# Patient Record
Sex: FEMALE | Race: WHITE | NOT HISPANIC OR LATINO | Employment: UNEMPLOYED | URBAN - METROPOLITAN AREA
[De-identification: names, ages, dates, MRNs, and addresses within clinical notes are randomized per-mention and may not be internally consistent; named-entity substitution may affect disease eponyms.]

---

## 2017-04-11 ENCOUNTER — HOSPITAL ENCOUNTER (EMERGENCY)
Facility: MEDICAL CENTER | Age: 37
End: 2017-04-12
Attending: EMERGENCY MEDICINE
Payer: MEDICAID

## 2017-04-11 DIAGNOSIS — R10.11 RUQ ABDOMINAL PAIN: ICD-10-CM

## 2017-04-11 DIAGNOSIS — R14.0 ABDOMINAL DISTENSION: Primary | ICD-10-CM

## 2017-04-11 DIAGNOSIS — Z87.19 HISTORY OF CIRRHOSIS: ICD-10-CM

## 2017-04-11 PROCEDURE — 99284 EMERGENCY DEPT VISIT MOD MDM: CPT

## 2017-04-11 ASSESSMENT — PAIN SCALES - GENERAL: PAINLEVEL_OUTOF10: 5

## 2017-04-11 ASSESSMENT — LIFESTYLE VARIABLES: DO YOU DRINK ALCOHOL: NO

## 2017-04-11 NOTE — ED AVS SNAPSHOT
Arvinas Access Code: [email or web address]  Expires: 5/12/2017  4:01 AM    Your email address is not on file at [employer]  Email Addresses are required for you to sign up for Arvinas, please contact [phone] to verify your personal information and to provide your email address prior to attempting to register for Arvinas.    Talia Salvatelli Moniz  [address] [place] [address]    Arvinas  A secure, online tool to manage your health information     [employer]s Arvinas® is a secure, online tool that connects you to your personalized health information from the privacy of your home -- day or night - making it very easy for you to manage your healthcare. Once the activation process is completed, you can even access your medical information using the Arvinas frank, which is available for free in the Apple Frank store or Google Play store.     To learn more about Arvinas, visit [email or web address]    There are two levels of access available (as shown below):   My Chart Features  [facility] Primary Care Doctor [facility]  Specialists [facility]  Urgent  Care Non-[facility] Primary Care Doctor   Email your healthcare team securely and privately 24/7 X X X    Manage appointments: schedule your next appointment; view details of past/upcoming appointments X      Request prescription refills. X      View recent personal medical records, including lab and immunizations X X X X   View health record, including health history, allergies, medications X X X X   Read reports about your outpatient visits, procedures, consult and ER notes X X X X   See your discharge summary, which is a recap of your hospital and/or ER visit that includes your diagnosis, lab results, and care plan X X  X     How to register for IKOR METERINGt:  Once your e-mail address has been verified, follow the following steps to sign up for IKOR METERINGt.     1. Go to  [email or web address]  2. Click on the Sign Up Now box, which takes you to the New Member Sign Up page. You  will need to provide the following information:  a. Enter your Your Survival Access Code exactly as it appears at the top of this page. (You will not need to use this code after you’ve completed the sign-up process. If you do not sign up before the expiration date, you must request a new code.)   b. Enter your date of birth.   c. Enter your home email address.   d. Click Submit, and follow the next screen’s instructions.  3. Create a Small Demonst ID. This will be your Your Survival login ID and cannot be changed, so think of one that is secure and easy to remember.  4. Create a Your Survival password. You can change your password at any time.  5. Enter your Password Reset Question and Answer. This can be used at a later time if you forget your password.   6. Enter your e-mail address. This allows you to receive e-mail notifications when new information is available in Your Survival.  7. Click Sign Up. You can now view your health information.    For assistance activating your Your Survival account, call [phone]

## 2017-04-11 NOTE — ED AVS SNAPSHOT
Home Care Instructions                                                                                                                Talia Salvatelli Moniz   MRN: [id number]    Department:  [facility], Emergency Dept   Date of Visit:  4/11/2017            [facility], Emergency Dept    [address] [facility] [address]    Phone:  [phone]      You were seen by     Hedy Saenz D.O.      Your Diagnosis Was     Abdominal distension     R14.0       Follow-up Information     1. Follow up with [facility] In 1 day.    Contact information    [address]  [facility]  [phone]          2. Follow up with [facility] [place] [facility] day.    Contact information    [address] [address] [phone]        3. Follow up with [facility] In 1 day.    Contact information    [address]  [facility] [address] [phone]          4. Follow up with [facility].    Contact information    [facility] [address] [phone]        Medication Information     Review all of your home medications and newly ordered medications with your primary doctor and/or pharmacist as soon as possible. Follow medication instructions as directed by your doctor and/or pharmacist.     Please keep your complete medication list with you and share with your physician. Update the information when medications are discontinued, doses are changed, or new medications (including over-the-counter products) are added; and carry medication information at all times in the event of emergency situations.               Medication List      ASK your doctor about these medications        Instructions    Morning Afternoon Evening Bedtime    lorazepam 0.5 MG Tabs   Commonly known as:  ATIVAN        Take 1 Tab by mouth every four hours as needed (vertigo).   Dose:  0.5 mg                                Procedures and tests performed during your visit     BETA-HCG QUALITATIVE URINE    CARDIAC MONITORING    CBC WITH DIFFERENTIAL    COMP METABOLIC PANEL    Cardiac Monitoring    ESTIMATED GFR    LIPASE    O2 Protocol    Pulse Ox    REFRACTOMETER SG    SALINE LOCK    URINALYSIS,CULTURE IF INDICATED    US-GALLBLADDER        Discharge Instructions       Return to the emergency department 24 hours for any persistent abdominal pain.  Return to the emergency department immediately for worsening abdominal pain, fever, vomiting, rectal bleeding or other new concerns.  Otherwise, follow-up with primary care 1-2 days for reevaluation, referral to GI, medication management and close blood pressure monitoring.    Clear liquid diet, advance to bland as tolerated.    Abdominal Pain, Adult  Many things can cause belly (abdominal) pain. Most times, the belly pain is not dangerous. Many cases of belly pain can be watched and treated at home.  HOME CARE   · Do not take medicines that help you go poop (laxatives) unless told to by your doctor.  · Only take medicine as told by your doctor.  · Eat or drink as told by your doctor. Your doctor will tell you if you should be on a special diet.  GET HELP IF:  · You do not know what is causing your belly pain.  · You have belly pain while you are sick to your stomach (nauseous) or have runny poop (diarrhea).  · You have pain while you pee or poop.  · Your belly pain wakes you up at night.  · You have belly pain that gets worse or better when you eat.  · You have belly pain that gets worse when you eat fatty foods.  · You have a fever.  GET HELP RIGHT AWAY IF:   · The pain does not go away within 2 hours.  · You keep throwing up (vomiting).  · The pain changes and is only in the right or left part of the belly.  · You have bloody or tarry looking poop.  MAKE SURE YOU:   · Understand these instructions.  · Will watch your condition.  · Will get help right away if you are not doing well or get worse.     This information is not intended to replace advice given to you by your  health care provider. Make sure you discuss any questions you have with your health care provider.     Document Released: 06/05/2009 Document Revised: 01/08/2016 Document Reviewed: 08/27/2014  Audioscribe Interactive Patient Education ©2016 [employer]  Cirrhosis  Cirrhosis is long-term (chronic) liver injury. The liver is your largest internal organ, and it performs many functions. The liver converts food into energy, removes toxic material from your blood, makes important proteins, and absorbs necessary vitamins from your diet.  If you have cirrhosis, it means many of your healthy liver cells have been replaced by scar tissue. This prevents blood from flowing through your liver, which makes it difficult for your liver to function. This scarring is not reversible, but treatment can prevent it from getting worse.   CAUSES   Hepatitis C and long-term alcohol abuse are the most common causes of cirrhosis. Other causes include:  · Nonalcoholic fatty liver disease.  · Hepatitis B infection.  · Autoimmune hepatitis.  · Diseases that cause blockage of ducts inside the liver.  · Inherited liver diseases.  · Reactions to certain long-term medicines.  · Parasitic infections.  · Long-term exposure to certain toxins.  RISK FACTORS  You may have a higher risk of cirrhosis if you:  · Have certain hepatitis viruses.  · Abuse alcohol, especially if you are female.  · Are overweight.  · Share needles.  · Have unprotected sex with someone who has hepatitis.  SYMPTOMS   You may not have any signs and symptoms at first. Symptoms may not develop until the damage to your liver starts to get worse. Signs and symptoms of cirrhosis may include:   · Tenderness in the right-upper part of your abdomen.  · Weakness and tiredness (fatigue).  · Loss of appetite.  · Nausea.  · Weight loss and muscle loss.  · Itchiness.  · Yellow skin and eyes (jaundice).  · Buildup of fluid in the abdomen (ascites).  · Swelling of the feet and ankles  (edema).  · Appearance of tiny blood vessels under the skin.  · Mental confusion.  · Easy bruising and bleeding.  DIAGNOSIS   Your health care provider may suspect cirrhosis based on your symptoms and medical history, especially if you have other medical conditions or a history of alcohol abuse. Your health care provider will do a physical exam to feel your liver and check for signs of cirrhosis. Your health care provider may perform other tests, including:   · Blood tests to check:    ¨ Whether you have hepatitis B or C.    ¨ Kidney function.  ¨ Liver function.  · Imaging tests such as:  ¨ MRI or CT scan to look for changes seen in advanced cirrhosis.  ¨ Ultrasound to see if normal liver tissue is being replaced by scar tissue.  · A procedure using a long needle to take a sample of liver tissue (biopsy) for examination under a microscope. Liver biopsy can confirm the diagnosis of cirrhosis.    TREATMENT   Treatment depends on how damaged your liver is and what caused the damage. Treatment may include treating cirrhosis symptoms or treating the underlying causes of the condition to try to slow the progression of the damage. Treatment may include:  · Making lifestyle changes, such as:    ¨ Eating a healthy diet.  ¨ Restricting salt intake.   ¨ Maintaining a healthy weight.    ¨ Not abusing drugs or alcohol.  · Taking medicines to:  ¨ Treat liver infections or other infections.  ¨ Control itching.  ¨ Reduce fluid buildup.  ¨ Reduce certain blood toxins.  ¨ Reduce risk of bleeding from enlarged blood vessels in the stomach or esophagus (varices).  · If varices are causing bleeding problems, you may need treatment with a procedure that ties up the vessels causing them to fall off (band ligation).  · If cirrhosis is causing your liver to fail, your health care provider may recommend a liver transplant.  · Other treatments may be recommended depending on any complications of cirrhosis, such as liver-related kidney failure  (hepatorenal syndrome).  HOME CARE INSTRUCTIONS   · Take medicines only as directed by your health care provider. Do not use drugs that are toxic to your liver. Ask your health care provider before taking any new medicines, including over-the-counter medicines.    · Rest as needed.  · Eat a well-balanced diet. Ask your health care provider or dietitian for more information.    · You may have to follow a low-salt diet or restrict your water intake as directed.  · Do not drink alcohol. This is especially important if you are taking acetaminophen.  · Keep all follow-up visits as directed by your health care provider. This is important.  SEEK MEDICAL CARE IF:  · You have fatigue or weakness that is getting worse.  · You develop swelling of the hands, feet, legs, or face.  · You have a fever.  · You develop loss of appetite.  · You have nausea or vomiting.  · You develop jaundice.  · You develop easy bruising or bleeding.  SEEK IMMEDIATE MEDICAL CARE IF:  · You vomit bright red blood or a material that looks like coffee grounds.  · You have blood in your stools.  · Your stools appear black and tarry.  · You become confused.  · You have chest pain or trouble breathing.     This information is not intended to replace advice given to you by your health care provider. Make sure you discuss any questions you have with your health care provider.     Document Released: 12/18/2006 Document Revised: 01/08/2016 Document Reviewed: 08/26/2015  Elsevier Interactive Patient Education ©2016 [employer]            Patient Information     Patient Information    Following emergency treatment: all patient requiring follow-up care must return either to a private physician or a clinic if your condition worsens before you are able to obtain further medical attention, please return to the emergency room.     Billing Information    At [facility], we work to make the billing process streamlined for our patients.  Our Representatives are  here to answer any questions you may have regarding your hospital bill.  If you have insurance coverage and have supplied your insurance information to us, we will submit a claim to your insurer on your behalf.  Should you have any questions regarding your bill, we can be reached online or by phone as follows:  Online: You are able pay your bills online or live chat with our representatives about any billing questions you may have. We are here to help Monday - Friday from 8:00am to 7:30pm and 9:00am - 12:00pm on Saturdays.  Please visit [email or web address]  for more information.   Phone:  [phone] or [phone]    Please note that your emergency physician, surgeon, pathologist, radiologist, anesthesiologist, and other specialists are not employed by [facility] and will therefore bill separately for their services.  Please contact them directly for any questions concerning their bills at the numbers below:     [facility]:  [phone]  [facility]:  [phone]  [facility]:  [phone]  [facility]:  [phone]    1. Your final bill may vary from the amount quoted upon discharge if all procedures are not complete at that time, or if your doctor has additional procedures of which we are not aware. You will receive an additional bill if you return to the Emergency Department at [facility] for suture removal regardless of the facility of which the sutures were placed.     2. Please arrange for settlement of this account at the emergency registration.    3. All self-pay accounts are due in full at the time of treatment.  If you are unable to meet this obligation then payment is expected within 4-5 days.     4. If you have had radiology studies (CT, X-ray, Ultrasound, MRI), you have received a preliminary result during your emergency department visit. Please contact the radiology department [phone] to  receive a copy of your final result. Please discuss the Final result with your primary physician or with the follow up physician provided.     Crisis Hotline:  [place] Crisis Hotline:  4-198-XVEVNLQ or 1-987.545.2020  [place] Crisis Hotline:    1-529.610.1627 or 391-321-0559         ED Discharge Follow Up Questions    1. In order to provide you with very good care, we would like to follow up with a phone call in the next few days.  May we have your permission to contact you?     YES /  NO    2. What is the best phone number to call you? (       )_____-__________    3. What is the best time to call you?      Morning  /  Afternoon  /  Evening                   Patient Signature:  ____________________________________________________________    Date:  ____________________________________________________________

## 2017-04-11 NOTE — ED AVS SNAPSHOT
4/12/2017          Talia Salvatelli Moniz  [address]  [place] [address]    Dear Olinda:    [facility] wants to ensure your discharge home is safe and you or your loved ones have had all your questions answered regarding your care after you leave the hospital.    You may receive a telephone call within two days of your discharge.  This call is to make certain you understand your discharge instructions as well as ensure we provided you with the best care possible during your stay with us.     The call will only last approximately 3-5 minutes and will be done by a nurse.    Once again, we want to ensure your discharge home is safe and that you have a clear understanding of any next steps in your care.  If you have any questions or concerns, please do not hesitate to contact us, we are here for you.  Thank you for choosing [facility] for your healthcare needs.    Sincerely,    Ever Carson    [facility]

## 2017-04-12 ENCOUNTER — APPOINTMENT (OUTPATIENT)
Dept: RADIOLOGY | Facility: MEDICAL CENTER | Age: 37
End: 2017-04-12
Attending: EMERGENCY MEDICINE
Payer: MEDICAID

## 2017-04-12 VITALS
TEMPERATURE: 98.4 F | WEIGHT: 155.2 LBS | HEART RATE: 85 BPM | SYSTOLIC BLOOD PRESSURE: 120 MMHG | DIASTOLIC BLOOD PRESSURE: 74 MMHG | OXYGEN SATURATION: 95 % | HEIGHT: 61 IN | RESPIRATION RATE: 18 BRPM | BODY MASS INDEX: 29.3 KG/M2

## 2017-04-12 LAB
ALBUMIN SERPL BCP-MCNC: 3.8 G/DL (ref 3.2–4.9)
ALBUMIN/GLOB SERPL: 1.4 G/DL
ALP SERPL-CCNC: 61 U/L (ref 30–99)
ALT SERPL-CCNC: 26 U/L (ref 2–50)
ANION GAP SERPL CALC-SCNC: 4 MMOL/L (ref 0–11.9)
APPEARANCE UR: CLEAR
AST SERPL-CCNC: 16 U/L (ref 12–45)
BASOPHILS # BLD AUTO: 0.6 % (ref 0–1.8)
BASOPHILS # BLD: 0.07 K/UL (ref 0–0.12)
BILIRUB SERPL-MCNC: 0.2 MG/DL (ref 0.1–1.5)
BILIRUB UR QL STRIP.AUTO: NEGATIVE
BUN SERPL-MCNC: 14 MG/DL (ref 8–22)
CALCIUM SERPL-MCNC: 8.8 MG/DL (ref 8.5–10.5)
CHLORIDE SERPL-SCNC: 105 MMOL/L (ref 96–112)
CO2 SERPL-SCNC: 26 MMOL/L (ref 20–33)
COLOR UR: NORMAL
CREAT SERPL-MCNC: 0.7 MG/DL (ref 0.5–1.4)
CULTURE IF INDICATED INDCX: NO UA CULTURE
EOSINOPHIL # BLD AUTO: 0.43 K/UL (ref 0–0.51)
EOSINOPHIL NFR BLD: 3.7 % (ref 0–6.9)
ERYTHROCYTE [DISTWIDTH] IN BLOOD BY AUTOMATED COUNT: 39.7 FL (ref 35.9–50)
GFR SERPL CREATININE-BSD FRML MDRD: >60 ML/MIN/1.73 M 2
GLOBULIN SER CALC-MCNC: 2.8 G/DL (ref 1.9–3.5)
GLUCOSE SERPL-MCNC: 84 MG/DL (ref 65–99)
GLUCOSE UR STRIP.AUTO-MCNC: NEGATIVE MG/DL
HCG UR QL: NEGATIVE
HCT VFR BLD AUTO: 41.9 % (ref 37–47)
HGB BLD-MCNC: 14 G/DL (ref 12–16)
IMM GRANULOCYTES # BLD AUTO: 0.02 K/UL (ref 0–0.11)
IMM GRANULOCYTES NFR BLD AUTO: 0.2 % (ref 0–0.9)
KETONES UR STRIP.AUTO-MCNC: NEGATIVE MG/DL
LEUKOCYTE ESTERASE UR QL STRIP.AUTO: NEGATIVE
LIPASE SERPL-CCNC: 29 U/L (ref 11–82)
LYMPHOCYTES # BLD AUTO: 4.26 K/UL (ref 1–4.8)
LYMPHOCYTES NFR BLD: 37 % (ref 22–41)
MCH RBC QN AUTO: 30.8 PG (ref 27–33)
MCHC RBC AUTO-ENTMCNC: 33.4 G/DL (ref 33.6–35)
MCV RBC AUTO: 92.1 FL (ref 81.4–97.8)
MICRO URNS: NORMAL
MONOCYTES # BLD AUTO: 1.03 K/UL (ref 0–0.85)
MONOCYTES NFR BLD AUTO: 9 % (ref 0–13.4)
NEUTROPHILS # BLD AUTO: 5.69 K/UL (ref 2–7.15)
NEUTROPHILS NFR BLD: 49.5 % (ref 44–72)
NITRITE UR QL STRIP.AUTO: NEGATIVE
NRBC # BLD AUTO: 0 K/UL
NRBC BLD AUTO-RTO: 0 /100 WBC
PH UR STRIP.AUTO: 6 [PH]
PLATELET # BLD AUTO: 348 K/UL (ref 164–446)
PMV BLD AUTO: 10.1 FL (ref 9–12.9)
POTASSIUM SERPL-SCNC: 3.8 MMOL/L (ref 3.6–5.5)
PROT SERPL-MCNC: 6.6 G/DL (ref 6–8.2)
PROT UR QL STRIP: NEGATIVE MG/DL
RBC # BLD AUTO: 4.55 M/UL (ref 4.2–5.4)
RBC UR QL AUTO: NEGATIVE
SODIUM SERPL-SCNC: 135 MMOL/L (ref 135–145)
SP GR UR REFRACTOMETRY: 1.01
SP GR UR STRIP.AUTO: 1.01
WBC # BLD AUTO: 11.5 K/UL (ref 4.8–10.8)

## 2017-04-12 PROCEDURE — 85025 COMPLETE CBC W/AUTO DIFF WBC: CPT

## 2017-04-12 PROCEDURE — 83690 ASSAY OF LIPASE: CPT

## 2017-04-12 PROCEDURE — 76705 ECHO EXAM OF ABDOMEN: CPT

## 2017-04-12 PROCEDURE — 36415 COLL VENOUS BLD VENIPUNCTURE: CPT

## 2017-04-12 PROCEDURE — 80053 COMPREHEN METABOLIC PANEL: CPT

## 2017-04-12 PROCEDURE — 81003 URINALYSIS AUTO W/O SCOPE: CPT

## 2017-04-12 PROCEDURE — 81025 URINE PREGNANCY TEST: CPT

## 2017-04-12 NOTE — ED NOTES
"Patient to ED triage with complaints of abdominal discomfort and bloating. She states she noticed a \"Lump\" in RUQ yesterday. She has been feeling fatigue and bloat over last 2-3 days. She also report received unexplained weight gain and issue with constipation. No fever. No urinary complaints.     Pt educated on ED process and asked to wait in lobby. Patient educated on importance of alerting staff to new or worsening symptoms or concerns.  "

## 2017-04-12 NOTE — ED NOTES
Dr. Kowalski notified of pt's status of re-evaluation. Dr. Kowalski went into pt's room to speak with pt. Will continue to monitor.

## 2017-04-12 NOTE — ED NOTES
Pt. Ambulated to [address] with steady gait. Pt. Assisted to the bathroom to obtain urine sample. Will continue to monitor.

## 2017-04-12 NOTE — ED PROVIDER NOTES
"ED Provider Note    CHIEF COMPLAINT  Chief Complaint   Patient presents with   • Abdominal Pain   • Bloating   • RUQ Pain   • Fatigue   • Nausea       HPI  Talia Salvatelli Moniz is a 36 y.o. female with reported twenty-year history of hepatitis C, untreated, presents to the emergency department with 4 days of increasing abdominal distention and abdominal pain. Patient describes diffuse abdominal pain and fullness, 6 out of 10, however none at this time, greatest in the right upper quadrant. Nausea without vomiting. Loose stools today, otherwise chronic constipation. No melena or hematochezia. No fever or chills. Pain is not change with by mouth intake. Patient states she can also feel a \"lump\" in the right upper quadrant \"just under my ribs\" but it appears to have resolved.    Hep C is untreated, she is followed by [facility], however released from their practice after multiple missed appointments.    REVIEW OF SYSTEMS  See HPI for further details. All other systems are negative.     PAST MEDICAL HISTORY   has a past medical history of Infectious disease; Hepatitis C; Bipolar disorder (CMS-HCC) (2008); Hepatitis C (3/22/2010); Bipolar affective disorder (CMS-HCC) (Dx 2008); Blood transfusion; and Substance abuse.    SOCIAL HISTORY  Social History     Social History Main Topics   • Smoking status: Current Every Day Smoker -- 1.00 packs/day for 19 years     Types: Cigarettes   • Smokeless tobacco: Not on file   • Alcohol Use: No   • Drug Use: Yes     Special: Methamphetamines      Comment: meth 13 years, Quit 3 yrs ago   • Sexual Activity:     Partners: Male      Comment: None       SURGICAL HISTORY   has past surgical history that includes other abdominal surgery; abdominal exploration; and pelviscopy (1/28/2014).    CURRENT MEDICATIONS  Home Medications     Reviewed by Beatriz Woodall R.N. (Registered Nurse) on 04/11/17 at 2305  Med List Status: Complete    Medication Last Dose Status    " "lorazepam (ATIVAN) 0.5 MG TABS  Active                ALLERGIES  No Known Allergies    PHYSICAL EXAM  VITAL SIGNS: /74 mmHg  Pulse 96  Temp(Src) 36.9 °C (98.4 °F)  Resp 19  Ht 1.549 m (5' 1\")  Wt 70.4 kg (155 lb 3.3 oz)  BMI 29.34 kg/m2  SpO2 97%  LMP 10/05/2012  Pulse ox interpretation: I interpret this pulse ox as normal.  Constitutional: Alert in no apparent distress.  HENT: Normocephalic, atraumatic. Bilateral external ears normal, Nose normal. Moist mucous membranes.    Eyes: Pupils are equal and reactive, Conjunctiva normal. No icterus.  Neck: Normal range of motion, Supple.  Lymphatic: No lymphadenopathy noted.   Cardiovascular: Regular rate and rhythm, no murmurs. Distal pulses intact.  No peripheral edema.  Thorax & Lungs: Normal breath sounds.  No wheezing/rales/ronchi. No increased work of breathing.  Abdomen: Soft, slightly distended. No reproducible tenderness to palpation. No palpable or pulsatile mass.  Skin: Warm, Dry, No erythema, No rash.   Musculoskeletal: Good range of motion in all major joints.   Neurologic: Alert , no gross focal deficit noted.  Psychiatric: Affect normal, Judgment normal, Mood normal.       DIAGNOSTIC STUDIES / PROCEDURES    LABS  Results for orders placed or performed during the hospital encounter of 04/11/17   CBC WITH DIFFERENTIAL   Result Value Ref Range    WBC 11.5 (H) 4.8 - 10.8 K/uL    RBC 4.55 4.20 - 5.40 M/uL    Hemoglobin 14.0 12.0 - 16.0 g/dL    Hematocrit 41.9 37.0 - 47.0 %    MCV 92.1 81.4 - 97.8 fL    MCH 30.8 27.0 - 33.0 pg    MCHC 33.4 (L) 33.6 - 35.0 g/dL    RDW 39.7 35.9 - 50.0 fL    Platelet Count 348 164 - 446 K/uL    MPV 10.1 9.0 - 12.9 fL    Neutrophils-Polys 49.50 44.00 - 72.00 %    Lymphocytes 37.00 22.00 - 41.00 %    Monocytes 9.00 0.00 - 13.40 %    Eosinophils 3.70 0.00 - 6.90 %    Basophils 0.60 0.00 - 1.80 %    Immature Granulocytes 0.20 0.00 - 0.90 %    Nucleated RBC 0.00 /100 WBC    Neutrophils (Absolute) 5.69 2.00 - 7.15 K/uL    " Lymphs (Absolute) 4.26 1.00 - 4.80 K/uL    Monos (Absolute) 1.03 (H) 0.00 - 0.85 K/uL    Eos (Absolute) 0.43 0.00 - 0.51 K/uL    Baso (Absolute) 0.07 0.00 - 0.12 K/uL    Immature Granulocytes (abs) 0.02 0.00 - 0.11 K/uL    NRBC (Absolute) 0.00 K/uL   COMP METABOLIC PANEL   Result Value Ref Range    Sodium 135 135 - 145 mmol/L    Potassium 3.8 3.6 - 5.5 mmol/L    Chloride 105 96 - 112 mmol/L    Co2 26 20 - 33 mmol/L    Anion Gap 4.0 0.0 - 11.9    Glucose 84 65 - 99 mg/dL    Bun 14 8 - 22 mg/dL    Creatinine 0.70 0.50 - 1.40 mg/dL    Calcium 8.8 8.5 - 10.5 mg/dL    AST(SGOT) 16 12 - 45 U/L    ALT(SGPT) 26 2 - 50 U/L    Alkaline Phosphatase 61 30 - 99 U/L    Total Bilirubin 0.2 0.1 - 1.5 mg/dL    Albumin 3.8 3.2 - 4.9 g/dL    Total Protein 6.6 6.0 - 8.2 g/dL    Globulin 2.8 1.9 - 3.5 g/dL    A-G Ratio 1.4 g/dL   LIPASE   Result Value Ref Range    Lipase 29 11 - 82 U/L   URINALYSIS,CULTURE IF INDICATED   Result Value Ref Range    Color Lt. Yellow     Character Clear     Specific Gravity 1.014 <1.035    Ph 6.0 5.0-8.0    Glucose Negative Negative mg/dL    Ketones Negative Negative mg/dL    Protein Negative Negative mg/dL    Bilirubin Negative Negative    Nitrite Negative Negative    Leukocyte Esterase Negative Negative    Occult Blood Negative Negative    Micro Urine Req see below     Culture Indicated No UA Culture   BETA-HCG QUALITATIVE URINE   Result Value Ref Range    Beta-Hcg Urine Negative Negative   REFRACTOMETER SG   Result Value Ref Range    Specific Gravity 1.014    ESTIMATED GFR   Result Value Ref Range    GFR If African American >60 >60 mL/min/1.73 m 2    GFR If Non African American >60 >60 mL/min/1.73 m 2       RADIOLOGY  US-GALLBLADDER   Final Result      1.  Contracted gallbladder. No gallstones identified. No biliary dilatation.      2.  Otherwise negative right upper quadrant ultrasound.           COURSE & MEDICAL DECISION MAKING  Nursing notes and vital signs were reviewed. (See chart for details)  The  patients records were reviewed, history was obtained from the patient;     Evaluation for abdominal distention and right upper quadrant abdominal pain is unrevealing. Abdominal exam is benign, nontender without peritonitis. Labs are unremarkable, no leukocytosis, likely derangement or abnormal LFTs. Urinalysis unremarkable. Pregnancy negative. Right upper quadrant ultrasound shows a contracted gallbladder however without gallstones, biliary dilatation or gallbladder wall thickening. No free fluid.  Vital signs are stable without fever or tachycardia. Borderline hypotension, suspect chronic for patient. Tolerating oral fluids without nausea or vomiting.    Patient is stable for discharge at this time, anticipatory guidance provided, close follow-up is encouraged with primary care and GI specialist, and strict ED return instructions have been detailed and include 24 return for any persistent abdominal pain immediately return for worsening pain, fever, vomiting, bleeding or other new concerns. Patient is agreeable to the disposition and plan.    Patient's blood pressure was elevated in the emergency department, and has been referred to primary care for close monitoring.    FINAL IMPRESSION  (R14.0) Abdominal distension  (primary encounter diagnosis)  (R10.11) RUQ abdominal pain  (Z87.19) History of cirrhosis      Electronically signed by: Hedy Saenz, 4/12/2017 12:54 AM      This dictation was created using voice recognition software. The accuracy of the dictation is limited to the abilities of the software. I expect there may be some errors of grammar and possibly content. The nursing notes were reviewed and certain aspects of this information were incorporated into this note.

## 2017-04-12 NOTE — ED NOTES
Pt. Updated on the plan of care for Dr. Kowalski to look over her test results. Pt. States no additional needs at this time. Will continue to monitor.

## 2017-04-12 NOTE — ED NOTES
Discharge instructions given to patient, a verbal understanding of all instructions was stated. IV removed, cathlon intact, site without s/s of infection. Pt preferred to walk out accompanied by . VSS, all belongings accounted for.

## 2017-04-12 NOTE — ED NOTES
Pt. Updated on the plan of care for ultrasound. Boyfriend at bedside. Call light within reach. Will continue to monitor.

## 2017-04-12 NOTE — DISCHARGE INSTRUCTIONS
Return to the emergency department 24 hours for any persistent abdominal pain.  Return to the emergency department immediately for worsening abdominal pain, fever, vomiting, rectal bleeding or other new concerns.  Otherwise, follow-up with primary care 1-2 days for reevaluation, referral to GI, medication management and close blood pressure monitoring.    Clear liquid diet, advance to bland as tolerated.    Abdominal Pain, Adult  Many things can cause belly (abdominal) pain. Most times, the belly pain is not dangerous. Many cases of belly pain can be watched and treated at home.  HOME CARE   · Do not take medicines that help you go poop (laxatives) unless told to by your doctor.  · Only take medicine as told by your doctor.  · Eat or drink as told by your doctor. Your doctor will tell you if you should be on a special diet.  GET HELP IF:  · You do not know what is causing your belly pain.  · You have belly pain while you are sick to your stomach (nauseous) or have runny poop (diarrhea).  · You have pain while you pee or poop.  · Your belly pain wakes you up at night.  · You have belly pain that gets worse or better when you eat.  · You have belly pain that gets worse when you eat fatty foods.  · You have a fever.  GET HELP RIGHT AWAY IF:   · The pain does not go away within 2 hours.  · You keep throwing up (vomiting).  · The pain changes and is only in the right or left part of the belly.  · You have bloody or tarry looking poop.  MAKE SURE YOU:   · Understand these instructions.  · Will watch your condition.  · Will get help right away if you are not doing well or get worse.     This information is not intended to replace advice given to you by your health care provider. Make sure you discuss any questions you have with your health care provider.     Document Released: 06/05/2009 Document Revised: 01/08/2016 Document Reviewed: 08/27/2014  Elsevier Interactive Patient Education ©2016 [employer]  Cirrhosis  Cirrhosis is long-term (chronic) liver injury. The liver is your largest internal organ, and it performs many functions. The liver converts food into energy, removes toxic material from your blood, makes important proteins, and absorbs necessary vitamins from your diet.  If you have cirrhosis, it means many of your healthy liver cells have been replaced by scar tissue. This prevents blood from flowing through your liver, which makes it difficult for your liver to function. This scarring is not reversible, but treatment can prevent it from getting worse.   CAUSES   Hepatitis C and long-term alcohol abuse are the most common causes of cirrhosis. Other causes include:  · Nonalcoholic fatty liver disease.  · Hepatitis B infection.  · Autoimmune hepatitis.  · Diseases that cause blockage of ducts inside the liver.  · Inherited liver diseases.  · Reactions to certain long-term medicines.  · Parasitic infections.  · Long-term exposure to certain toxins.  RISK FACTORS  You may have a higher risk of cirrhosis if you:  · Have certain hepatitis viruses.  · Abuse alcohol, especially if you are female.  · Are overweight.  · Share needles.  · Have unprotected sex with someone who has hepatitis.  SYMPTOMS   You may not have any signs and symptoms at first. Symptoms may not develop until the damage to your liver starts to get worse. Signs and symptoms of cirrhosis may include:   · Tenderness in the right-upper part of your abdomen.  · Weakness and tiredness (fatigue).  · Loss of appetite.  · Nausea.  · Weight loss and muscle loss.  · Itchiness.  · Yellow skin and eyes (jaundice).  · Buildup of fluid in the abdomen (ascites).  · Swelling of the feet and ankles (edema).  · Appearance of tiny blood vessels under the skin.  · Mental confusion.  · Easy bruising and bleeding.  DIAGNOSIS   Your health care provider may suspect cirrhosis based on your symptoms and medical history, especially if you have other medical  conditions or a history of alcohol abuse. Your health care provider will do a physical exam to feel your liver and check for signs of cirrhosis. Your health care provider may perform other tests, including:   · Blood tests to check:    ¨ Whether you have hepatitis B or C.    ¨ Kidney function.  ¨ Liver function.  · Imaging tests such as:  ¨ MRI or CT scan to look for changes seen in advanced cirrhosis.  ¨ Ultrasound to see if normal liver tissue is being replaced by scar tissue.  · A procedure using a long needle to take a sample of liver tissue (biopsy) for examination under a microscope. Liver biopsy can confirm the diagnosis of cirrhosis.    TREATMENT   Treatment depends on how damaged your liver is and what caused the damage. Treatment may include treating cirrhosis symptoms or treating the underlying causes of the condition to try to slow the progression of the damage. Treatment may include:  · Making lifestyle changes, such as:    ¨ Eating a healthy diet.  ¨ Restricting salt intake.   ¨ Maintaining a healthy weight.    ¨ Not abusing drugs or alcohol.  · Taking medicines to:  ¨ Treat liver infections or other infections.  ¨ Control itching.  ¨ Reduce fluid buildup.  ¨ Reduce certain blood toxins.  ¨ Reduce risk of bleeding from enlarged blood vessels in the stomach or esophagus (varices).  · If varices are causing bleeding problems, you may need treatment with a procedure that ties up the vessels causing them to fall off (band ligation).  · If cirrhosis is causing your liver to fail, your health care provider may recommend a liver transplant.  · Other treatments may be recommended depending on any complications of cirrhosis, such as liver-related kidney failure (hepatorenal syndrome).  HOME CARE INSTRUCTIONS   · Take medicines only as directed by your health care provider. Do not use drugs that are toxic to your liver. Ask your health care provider before taking any new medicines, including over-the-counter  medicines.    · Rest as needed.  · Eat a well-balanced diet. Ask your health care provider or dietitian for more information.    · You may have to follow a low-salt diet or restrict your water intake as directed.  · Do not drink alcohol. This is especially important if you are taking acetaminophen.  · Keep all follow-up visits as directed by your health care provider. This is important.  SEEK MEDICAL CARE IF:  · You have fatigue or weakness that is getting worse.  · You develop swelling of the hands, feet, legs, or face.  · You have a fever.  · You develop loss of appetite.  · You have nausea or vomiting.  · You develop jaundice.  · You develop easy bruising or bleeding.  SEEK IMMEDIATE MEDICAL CARE IF:  · You vomit bright red blood or a material that looks like coffee grounds.  · You have blood in your stools.  · Your stools appear black and tarry.  · You become confused.  · You have chest pain or trouble breathing.     This information is not intended to replace advice given to you by your health care provider. Make sure you discuss any questions you have with your health care provider.     Document Released: 12/18/2006 Document Revised: 01/08/2016 Document Reviewed: 08/26/2015  [employer] Interactive Patient Education ©2016 [employer]

## 2017-08-21 ENCOUNTER — HOSPITAL ENCOUNTER (EMERGENCY)
Facility: MEDICAL CENTER | Age: 37
End: 2017-08-21
Payer: MEDICAID

## 2017-08-21 VITALS
WEIGHT: 142.64 LBS | HEIGHT: 61 IN | OXYGEN SATURATION: 99 % | SYSTOLIC BLOOD PRESSURE: 105 MMHG | TEMPERATURE: 98.1 F | RESPIRATION RATE: 18 BRPM | BODY MASS INDEX: 26.93 KG/M2 | DIASTOLIC BLOOD PRESSURE: 84 MMHG | HEART RATE: 87 BPM

## 2017-08-21 PROCEDURE — 302449 STATCHG TRIAGE ONLY (STATISTIC)

## 2017-08-21 ASSESSMENT — PAIN SCALES - GENERAL: PAINLEVEL_OUTOF10: 7

## 2017-08-22 ENCOUNTER — HOSPITAL ENCOUNTER (EMERGENCY)
Facility: MEDICAL CENTER | Age: 37
End: 2017-08-22
Attending: EMERGENCY MEDICINE
Payer: MEDICAID

## 2017-08-22 VITALS
DIASTOLIC BLOOD PRESSURE: 65 MMHG | SYSTOLIC BLOOD PRESSURE: 110 MMHG | HEIGHT: 61 IN | TEMPERATURE: 98 F | HEART RATE: 94 BPM | WEIGHT: 142.64 LBS | BODY MASS INDEX: 26.93 KG/M2 | RESPIRATION RATE: 16 BRPM | OXYGEN SATURATION: 93 %

## 2017-08-22 DIAGNOSIS — N39.0 URINARY TRACT INFECTION WITHOUT HEMATURIA, SITE UNSPECIFIED: ICD-10-CM

## 2017-08-22 DIAGNOSIS — N76.0 ACUTE VAGINITIS: ICD-10-CM

## 2017-08-22 DIAGNOSIS — N76.0 BACTERIAL VAGINITIS: ICD-10-CM

## 2017-08-22 DIAGNOSIS — B96.89 BACTERIAL VAGINITIS: ICD-10-CM

## 2017-08-22 LAB
ALBUMIN SERPL BCP-MCNC: 3.5 G/DL (ref 3.2–4.9)
ALBUMIN/GLOB SERPL: 1.4 G/DL
ALP SERPL-CCNC: 56 U/L (ref 30–99)
ALT SERPL-CCNC: 15 U/L (ref 2–50)
AMORPH CRY #/AREA URNS HPF: PRESENT /HPF
ANION GAP SERPL CALC-SCNC: 9 MMOL/L (ref 0–11.9)
APPEARANCE UR: CLEAR
AST SERPL-CCNC: 12 U/L (ref 12–45)
BACTERIA #/AREA URNS HPF: ABNORMAL /HPF
BACTERIA GENITAL QL WET PREP: NORMAL
BASOPHILS # BLD AUTO: 0.4 % (ref 0–1.8)
BASOPHILS # BLD: 0.05 K/UL (ref 0–0.12)
BILIRUB SERPL-MCNC: 0.4 MG/DL (ref 0.1–1.5)
BILIRUB UR QL STRIP.AUTO: NEGATIVE
BUN SERPL-MCNC: 8 MG/DL (ref 8–22)
CALCIUM SERPL-MCNC: 8.9 MG/DL (ref 8.5–10.5)
CHLORIDE SERPL-SCNC: 106 MMOL/L (ref 96–112)
CO2 SERPL-SCNC: 23 MMOL/L (ref 20–33)
COLOR UR: YELLOW
CREAT SERPL-MCNC: 0.59 MG/DL (ref 0.5–1.4)
CULTURE IF INDICATED INDCX: YES UA CULTURE
EOSINOPHIL # BLD AUTO: 0.17 K/UL (ref 0–0.51)
EOSINOPHIL NFR BLD: 1.4 % (ref 0–6.9)
EPI CELLS #/AREA URNS HPF: ABNORMAL /HPF
ERYTHROCYTE [DISTWIDTH] IN BLOOD BY AUTOMATED COUNT: 38.4 FL (ref 35.9–50)
GFR SERPL CREATININE-BSD FRML MDRD: >60 ML/MIN/1.73 M 2
GLOBULIN SER CALC-MCNC: 2.5 G/DL (ref 1.9–3.5)
GLUCOSE SERPL-MCNC: 88 MG/DL (ref 65–99)
GLUCOSE UR STRIP.AUTO-MCNC: NEGATIVE MG/DL
HCG UR QL: NEGATIVE
HCT VFR BLD AUTO: 38.9 % (ref 37–47)
HGB BLD-MCNC: 13.1 G/DL (ref 12–16)
IMM GRANULOCYTES # BLD AUTO: 0.03 K/UL (ref 0–0.11)
IMM GRANULOCYTES NFR BLD AUTO: 0.3 % (ref 0–0.9)
KETONES UR STRIP.AUTO-MCNC: NEGATIVE MG/DL
LEUKOCYTE ESTERASE UR QL STRIP.AUTO: ABNORMAL
LIPASE SERPL-CCNC: 14 U/L (ref 11–82)
LYMPHOCYTES # BLD AUTO: 3.84 K/UL (ref 1–4.8)
LYMPHOCYTES NFR BLD: 32.3 % (ref 22–41)
MCH RBC QN AUTO: 30 PG (ref 27–33)
MCHC RBC AUTO-ENTMCNC: 33.7 G/DL (ref 33.6–35)
MCV RBC AUTO: 89 FL (ref 81.4–97.8)
MICRO URNS: ABNORMAL
MONOCYTES # BLD AUTO: 0.77 K/UL (ref 0–0.85)
MONOCYTES NFR BLD AUTO: 6.5 % (ref 0–13.4)
MUCOUS THREADS #/AREA URNS HPF: ABNORMAL /HPF
NEUTROPHILS # BLD AUTO: 7.03 K/UL (ref 2–7.15)
NEUTROPHILS NFR BLD: 59.1 % (ref 44–72)
NITRITE UR QL STRIP.AUTO: NEGATIVE
NRBC # BLD AUTO: 0 K/UL
NRBC BLD AUTO-RTO: 0 /100 WBC
PH UR STRIP.AUTO: 6.5 [PH]
PLATELET # BLD AUTO: 305 K/UL (ref 164–446)
PMV BLD AUTO: 10 FL (ref 9–12.9)
POTASSIUM SERPL-SCNC: 3.5 MMOL/L (ref 3.6–5.5)
PROT SERPL-MCNC: 6 G/DL (ref 6–8.2)
PROT UR QL STRIP: NEGATIVE MG/DL
RBC # BLD AUTO: 4.37 M/UL (ref 4.2–5.4)
RBC # URNS HPF: ABNORMAL /HPF
RBC UR QL AUTO: NEGATIVE
SIGNIFICANT IND 70042: NORMAL
SITE SITE: NORMAL
SODIUM SERPL-SCNC: 138 MMOL/L (ref 135–145)
SOURCE SOURCE: NORMAL
SP GR UR REFRACTOMETRY: 1.01
SP GR UR STRIP.AUTO: 1.01
UROBILINOGEN UR STRIP.AUTO-MCNC: 1 MG/DL
WBC # BLD AUTO: 11.9 K/UL (ref 4.8–10.8)
WBC #/AREA URNS HPF: ABNORMAL /HPF

## 2017-08-22 PROCEDURE — 700111 HCHG RX REV CODE 636 W/ 250 OVERRIDE (IP): Performed by: EMERGENCY MEDICINE

## 2017-08-22 PROCEDURE — 81025 URINE PREGNANCY TEST: CPT

## 2017-08-22 PROCEDURE — 83690 ASSAY OF LIPASE: CPT

## 2017-08-22 PROCEDURE — 80053 COMPREHEN METABOLIC PANEL: CPT

## 2017-08-22 PROCEDURE — 85025 COMPLETE CBC W/AUTO DIFF WBC: CPT

## 2017-08-22 PROCEDURE — 81001 URINALYSIS AUTO W/SCOPE: CPT

## 2017-08-22 PROCEDURE — 87086 URINE CULTURE/COLONY COUNT: CPT

## 2017-08-22 PROCEDURE — 99284 EMERGENCY DEPT VISIT MOD MDM: CPT

## 2017-08-22 PROCEDURE — 700102 HCHG RX REV CODE 250 W/ 637 OVERRIDE(OP): Performed by: EMERGENCY MEDICINE

## 2017-08-22 PROCEDURE — A9270 NON-COVERED ITEM OR SERVICE: HCPCS | Performed by: EMERGENCY MEDICINE

## 2017-08-22 PROCEDURE — 87591 N.GONORRHOEAE DNA AMP PROB: CPT

## 2017-08-22 PROCEDURE — 87491 CHLMYD TRACH DNA AMP PROBE: CPT

## 2017-08-22 PROCEDURE — 96372 THER/PROPH/DIAG INJ SC/IM: CPT

## 2017-08-22 RX ORDER — METRONIDAZOLE 500 MG/1
500 TABLET ORAL 2 TIMES DAILY
Qty: 14 TAB | Refills: 0 | Status: SHIPPED | OUTPATIENT
Start: 2017-08-22 | End: 2017-08-29

## 2017-08-22 RX ORDER — AZITHROMYCIN 250 MG/1
1000 TABLET, FILM COATED ORAL ONCE
Status: COMPLETED | OUTPATIENT
Start: 2017-08-22 | End: 2017-08-22

## 2017-08-22 RX ORDER — ONDANSETRON 4 MG/1
4 TABLET, ORALLY DISINTEGRATING ORAL ONCE
Status: COMPLETED | OUTPATIENT
Start: 2017-08-22 | End: 2017-08-22

## 2017-08-22 RX ORDER — CEFTRIAXONE SODIUM 250 MG/1
250 INJECTION, POWDER, FOR SOLUTION INTRAMUSCULAR; INTRAVENOUS ONCE
Status: COMPLETED | OUTPATIENT
Start: 2017-08-22 | End: 2017-08-22

## 2017-08-22 RX ORDER — CEFDINIR 300 MG/1
300 CAPSULE ORAL 2 TIMES DAILY
Qty: 12 CAP | Refills: 0 | Status: SHIPPED | OUTPATIENT
Start: 2017-08-22 | End: 2017-08-28

## 2017-08-22 RX ADMIN — ONDANSETRON 4 MG: 4 TABLET, ORALLY DISINTEGRATING ORAL at 06:50

## 2017-08-22 RX ADMIN — AZITHROMYCIN 1000 MG: 250 TABLET, FILM COATED ORAL at 06:50

## 2017-08-22 RX ADMIN — CEFTRIAXONE SODIUM 250 MG: 250 INJECTION, POWDER, FOR SOLUTION INTRAMUSCULAR; INTRAVENOUS at 06:50

## 2017-08-22 ASSESSMENT — LIFESTYLE VARIABLES
HAVE YOU EVER FELT YOU SHOULD CUT DOWN ON YOUR DRINKING: NO
TOTAL SCORE: 0
EVER FELT BAD OR GUILTY ABOUT YOUR DRINKING: NO
TOTAL SCORE: 0
HAVE PEOPLE ANNOYED YOU BY CRITICIZING YOUR DRINKING: NO
TOTAL SCORE: 0
EVER HAD A DRINK FIRST THING IN THE MORNING TO STEADY YOUR NERVES TO GET RID OF A HANGOVER: NO
CONSUMPTION TOTAL: INCOMPLETE
DO YOU DRINK ALCOHOL: YES

## 2017-08-22 ASSESSMENT — ENCOUNTER SYMPTOMS
NAUSEA: 1
FEVER: 0
CONSTIPATION: 1
VOMITING: 0
ABDOMINAL PAIN: 1
HEADACHES: 0
SHORTNESS OF BREATH: 0

## 2017-08-22 ASSESSMENT — PAIN SCALES - GENERAL: PAINLEVEL_OUTOF10: 5

## 2017-08-22 NOTE — ED NOTES
Attempted IV access x 2 without success. Jorden Castelan RN attempted IV access x 4 without success. Called phlebotomy to obtain labs.

## 2017-08-22 NOTE — ED AVS SNAPSHOT
8/22/2017    Talia Salvatelli Moniz  [address]  [place] [address]    Dear Olinda:    [facility] wants to ensure your discharge home is safe and you or your loved ones have had all of your questions answered regarding your care after you leave the hospital.    Below is a list of resources and contact information should you have any questions regarding your hospital stay, follow-up instructions, or active medical symptoms.    Questions or Concerns Regarding… Contact   Medical Questions Related to Your Discharge  (7 days a week, 8am-5pm) Contact a Nurse Care Coordinator   [phone]   Medical Questions Not Related to Your Discharge  (24 hours a day / 7 days a week)  Contact the Nurse Health Line   [phone]    Medications or Discharge Instructions Refer to your discharge packet   or contact your [facility] Primary Care Provider   [phone]   Follow-up Appointment(s) Schedule your appointment via [employer]   or contact Scheduling [phone]   Billing Review your statement via [employer]  or contact Billing [phone]   Medical Records Review your records via [employer]   or contact Medical Records [phone]     You may receive a telephone call within two days of discharge. This call is to make certain you understand your discharge instructions and have the opportunity to have any questions answered. You can also easily access your medical information, test results and upcoming appointments via the [employer] free online health management tool. You can learn more and sign up at [facility]. For assistance setting up your [employer] account, please call [phone].    Once again, we want to ensure your discharge home is safe and that you have a clear understanding of any next steps in your care. If you have any questions or concerns, please do not hesitate to contact us, we are here for you. Thank you for choosing [facility] for your healthcare needs.    Sincerely,    Your [facility] Healthcare Team

## 2017-08-22 NOTE — ED NOTES
Assumed care of patient. Patient complains of RLQ abdominal pain with nausea with pain radiating into back and down right thigh x tonight with constipation x 3 days.  Patient alert and oriented x 4. Patient denies any other complaints at this time. Patient side rails up x 2 and call bell within reach.

## 2017-08-22 NOTE — ED PROVIDER NOTES
"ED Provider Note    Scribed for Zuleika Weir D.O. by Michael Srivastava. 8/22/2017, 3:55 AM.    Primary care provider: Pcp Pt States None  Means of arrival: Walk in  History obtained from: Patient  History limited by: None    CHIEF COMPLAINT  Chief Complaint   Patient presents with   • RLQ Pain       HPI  Talia Salvatelli Moniz is a 37 y.o. female who presents to the Emergency Department complaining of right lower quadrant pain onset two days ago. Patient states the pain started as a cramping pain. The pain was worsened yesterday. She also reports associated constipation, nausea which is currently resolved, bloating, and loss of appetite. Patient denies any fevers, vomiting, or urinary symptoms including any hematuria. Eating does not make the pain worse. She reports, that her  also, recently \"cheated on her\" and she has had discharge recently with an odor. She has been drinking fluids normally. Patient reports having a surgical procedure done in the past in which both of her fallopian tubes were removed secondary to an ectopic pregnancy. She reports her current symptoms are similar to when she had the ectopic pregnancy. Patient still has her uterus and ovaries. She still has her appendix. Patient was here in April 2017 for right lower quadrant pain. She denies currently taking any medications.     REVIEW OF SYSTEMS  Review of Systems   Constitutional: Negative for fever.   Respiratory: Negative for shortness of breath.    Cardiovascular: Negative for chest pain.   Gastrointestinal: Positive for nausea, abdominal pain (RLQ) and constipation. Negative for vomiting.        Positive bloating, loss of appetite   Genitourinary: Positive for dysuria. Negative for hematuria.   Neurological: Negative for headaches.   All other systems reviewed and are negative.  C    PAST MEDICAL HISTORY   has a past medical history of Infectious disease; Hepatitis C; Bipolar disorder (CMS-HCC) (2008); Hepatitis C (3/22/2010); " "Bipolar affective disorder (CMS-HCC) (Dx 2008); Blood transfusion; Substance abuse; and Hepatitis C.    SURGICAL HISTORY   has past surgical history that includes other abdominal surgery; abdominal exploration; and pelviscopy (1/28/2014).    SOCIAL HISTORY  Social History   Substance Use Topics   • Smoking status: Current Every Day Smoker -- 1.00 packs/day for 19 years     Types: Cigarettes   • Smokeless tobacco: None   • Alcohol Use: Yes      Comment: rare      History   Drug Use   • Yes   • Special: Methamphetamines     Comment: meth 13 years, Quit 3 yrs ago       FAMILY HISTORY  Family History   Problem Relation Age of Onset   • Psychiatry Mother      Bipolar   • Cancer Father      Liver & Lung   • Lung Disease Father      Lung CA, COPD   • Cancer Maternal Grandmother      lung   • Psychiatry Maternal Grandfather      Schizophrenia       CURRENT MEDICATIONS  Home Medications     Reviewed by Ara Nur R.N. (Registered Nurse) on 08/22/17 at 0315  Med List Status: Complete    Medication Last Dose Status    lorazepam (ATIVAN) 0.5 MG TABS  Active                ALLERGIES  No Known Allergies    PHYSICAL EXAM  VITAL SIGNS: /79 mmHg  Pulse 98  Temp(Src) 36.7 °C (98 °F) (Temporal)  Resp 16  Ht 1.549 m (5' 0.98\")  Wt 64.7 kg (142 lb 10.2 oz)  BMI 26.97 kg/m2  SpO2 98%  LMP 10/01/2012 (Within Days)  Vitals reviewed.  Constitutional: Patient is oriented to person, place, and time. Appears well-developed and well-nourished. No distress.    Head: Normocephalic and atraumatic.   Ears: Normal external ears bilaterally.   Mouth/Throat: Oropharynx is clear and moist  Eyes: Conjunctivae are normal. Pupils are equal, round, and reactive to light.   Cardiovascular: Normal rate, regular rhythm and normal heart sounds. Normal peripheral pulses.  Pulmonary/Chest: Effort normal and breath sounds normal. No respiratory distress, no wheezes, rhonchi, or rales. No chest wall tenderness.  Abdominal: Soft. Bowel sounds " are normal. There is mild RLQ tenderness, no rebound or guarding, or peritoneal signs. No CVA tenderness.  Pelvic: No CMT, no masses, no adnexal tenderness. Moderate amount of yellowish discharge, no odor.  Musculoskeletal: No edema and no tenderness.   Neurological: No focal deficits.   Skin: Skin is warm and dry. No erythema. No pallor.   Psychiatric: Patient has a normal mood and affect.     LABS  Results for orders placed or performed during the hospital encounter of 08/22/17   CBC WITH DIFFERENTIAL   Result Value Ref Range    WBC 11.9 (H) 4.8 - 10.8 K/uL    RBC 4.37 4.20 - 5.40 M/uL    Hemoglobin 13.1 12.0 - 16.0 g/dL    Hematocrit 38.9 37.0 - 47.0 %    MCV 89.0 81.4 - 97.8 fL    MCH 30.0 27.0 - 33.0 pg    MCHC 33.7 33.6 - 35.0 g/dL    RDW 38.4 35.9 - 50.0 fL    Platelet Count 305 164 - 446 K/uL    MPV 10.0 9.0 - 12.9 fL    Neutrophils-Polys 59.10 44.00 - 72.00 %    Lymphocytes 32.30 22.00 - 41.00 %    Monocytes 6.50 0.00 - 13.40 %    Eosinophils 1.40 0.00 - 6.90 %    Basophils 0.40 0.00 - 1.80 %    Immature Granulocytes 0.30 0.00 - 0.90 %    Nucleated RBC 0.00 /100 WBC    Neutrophils (Absolute) 7.03 2.00 - 7.15 K/uL    Lymphs (Absolute) 3.84 1.00 - 4.80 K/uL    Monos (Absolute) 0.77 0.00 - 0.85 K/uL    Eos (Absolute) 0.17 0.00 - 0.51 K/uL    Baso (Absolute) 0.05 0.00 - 0.12 K/uL    Immature Granulocytes (abs) 0.03 0.00 - 0.11 K/uL    NRBC (Absolute) 0.00 K/uL   CMP   Result Value Ref Range    Sodium 138 135 - 145 mmol/L    Potassium 3.5 (L) 3.6 - 5.5 mmol/L    Chloride 106 96 - 112 mmol/L    Co2 23 20 - 33 mmol/L    Anion Gap 9.0 0.0 - 11.9    Glucose 88 65 - 99 mg/dL    Bun 8 8 - 22 mg/dL    Creatinine 0.59 0.50 - 1.40 mg/dL    Calcium 8.9 8.5 - 10.5 mg/dL    AST(SGOT) 12 12 - 45 U/L    ALT(SGPT) 15 2 - 50 U/L    Alkaline Phosphatase 56 30 - 99 U/L    Total Bilirubin 0.4 0.1 - 1.5 mg/dL    Albumin 3.5 3.2 - 4.9 g/dL    Total Protein 6.0 6.0 - 8.2 g/dL    Globulin 2.5 1.9 - 3.5 g/dL    A-G Ratio 1.4 g/dL    LIPASE   Result Value Ref Range    Lipase 14 11 - 82 U/L   URINALYSIS,CULTURE IF INDICATED   Result Value Ref Range    Color Yellow     Character Clear     Specific Gravity 1.014 <1.035    Ph 6.5 5.0-8.0    Glucose Negative Negative mg/dL    Ketones Negative Negative mg/dL    Protein Negative Negative mg/dL    Bilirubin Negative Negative    Urobilinogen, Urine 1.0 Negative    Nitrite Negative Negative    Leukocyte Esterase Large (A) Negative    Occult Blood Negative Negative    Micro Urine Req Microscopic     Culture Indicated Yes UA Culture   BETA-HCG QUALITATIVE URINE   Result Value Ref Range    Beta-Hcg Urine Negative Negative   REFRACTOMETER SG   Result Value Ref Range    Specific Gravity 1.014    URINE MICROSCOPIC (W/UA)   Result Value Ref Range    WBC 20-50 (A) /hpf    RBC 2-5 (A) /hpf    Bacteria Moderate (A) None /hpf    Epithelial Cells Few /hpf    Mucous Threads Moderate /hpf    Amorphous Crystal Present /hpf   WET PREP   Result Value Ref Range    Significant Indicator NEG     Source GEN     Site VAGINAL     Wet Prep For Parasites       Many WBC's seen.  Few clue cells seen.  No yeast.  No motile Trichomonas seen.     CHLAMYDIA & GC BY PCR   Result Value Ref Range    Source Vaginal    ESTIMATED GFR   Result Value Ref Range    GFR If African American >60 >60 mL/min/1.73 m 2    GFR If Non African American >60 >60 mL/min/1.73 m 2      All labs reviewed by me.    COURSE & MEDICAL DECISION MAKING  Pertinent Labs & Imaging studies reviewed. (See chart for details)    Obtained and reviewed past medical records which show patient was here in April 2017 with right lower quadrant pain. She was also here in January 2014 for abdominal pain.    4:07 AM - Patient seen and examined at bedside. This is a 37-year-old female who presents with right lower quadrant pain. Overall, no peritoneal signs. She has had this similar pain in the past. She does not have worsening symptoms with eating. In addition, she's got urinary  symptoms and discharge. I suspect that this is not appendicitis. However, it would be in the differential.     Discussed plan of care which includes diagnostic tests and pelvic exam. Patient understands and agrees.  Ordered estimated GFR, wet prep, chlamydia & GC by PCR, urine microscopic, beta hcg qualitative urine, CBC, CMP, lipase, urinalysis culture, refractometer SG, urine culture to evaluate her symptoms. The differential diagnoses include but are not limited to: constipation, UTI, PID/vaginitis, less likely appendicitis     5:21 AM Patient reevaluated at bedside. Performed pelvic exam, see physical exam above.     6:41 AM Patient reevaluated at bedside. Patient has a benign abdominal on re-exam. I do not think that imaging is necessary at this time. I don't think that this is a case of appendicitis. However, she is given strict return precautions regarding change or worsening symptoms. She, herself, has high suspicion for STD exposure, given her 's recent infidelity. Will treat prophylactically. Discussed lab results as seen above which shows vaginal infection and urinary tract infection. The patient will be discharged with instructions regarding supportive care and medications including flagyl and omnicef. Discussed indications for seeking immediate medical attention. Patient was given the opportunity for questions. The patient understands and agrees.         The patient will return for new or worsening symptoms and is stable at the time of discharge.    The patient is referred to a primary physician for blood pressure management, diabetic screening, and for all other preventative health concerns.    DISPOSITION:  Patient will be discharged home in stable condition.    FOLLOW UP:  Your Physician  Varies    In 2 days      [facility], Emergency Dept  [address] [place] [address] [phone]    If symptoms worsen      OUTPATIENT MEDICATIONS:  Discharge Medication List as  of 8/22/2017  6:46 AM      START taking these medications    Details   metronidazole (FLAGYL) 500 MG Tab Take 1 Tab by mouth 2 Times a Day for 7 days., Disp-14 Tab, R-0, Print Rx Paper      cefdinir (OMNICEF) 300 MG Cap Take 1 Cap by mouth 2 times a day for 6 days., Disp-12 Cap, R-0, Print Rx Paper                FINAL IMPRESSION  1. Urinary tract infection without hematuria, site unspecified    2. Acute vaginitis    3. Bacterial vaginitis          Michael BOOTH (Scribe), am scribing for, and in the presence of, Zuleika Weir D.O..    Electronically signed by: Michael Srivastava (Scribe), 8/22/2017    IZuleika D.O. personally performed the services described in this documentation, as scribed by Michael Srivastava in my presence, and it is both accurate and complete.    The note accurately reflects work and decisions made by me.  Zuleika Weir  8/22/2017  10:54 AM

## 2017-08-22 NOTE — DISCHARGE INSTRUCTIONS
Bacterial Vaginosis  Bacterial vaginosis is a vaginal infection that occurs when the normal balance of bacteria in the vagina is disrupted. It results from an overgrowth of certain bacteria. This is the most common vaginal infection in women of childbearing age. Treatment is important to prevent complications, especially in pregnant women, as it can cause a premature delivery.  CAUSES   Bacterial vaginosis is caused by an increase in harmful bacteria that are normally present in smaller amounts in the vagina. Several different kinds of bacteria can cause bacterial vaginosis. However, the reason that the condition develops is not fully understood.  RISK FACTORS  Certain activities or behaviors can put you at an increased risk of developing bacterial vaginosis, including:  · Having a new sex partner or multiple sex partners.  · Douching.  · Using an intrauterine device (IUD) for contraception.  Women do not get bacterial vaginosis from toilet seats, bedding, swimming pools, or contact with objects around them.  SIGNS AND SYMPTOMS   Some women with bacterial vaginosis have no signs or symptoms. Common symptoms include:  · Grey vaginal discharge.  · A fishlike odor with discharge, especially after sexual intercourse.  · Itching or burning of the vagina and vulva.  · Burning or pain with urination.  DIAGNOSIS   Your health care provider will take a medical history and examine the vagina for signs of bacterial vaginosis. A sample of vaginal fluid may be taken. Your health care provider will look at this sample under a microscope to check for bacteria and abnormal cells. A vaginal pH test may also be done.   TREATMENT   Bacterial vaginosis may be treated with antibiotic medicines. These may be given in the form of a pill or a vaginal cream. A second round of antibiotics may be prescribed if the condition comes back after treatment. Because bacterial vaginosis increases your risk for sexually transmitted diseases, getting  treated can help reduce your risk for chlamydia, gonorrhea, HIV, and herpes.  HOME CARE INSTRUCTIONS   · Only take over-the-counter or prescription medicines as directed by your health care provider.  · If antibiotic medicine was prescribed, take it as directed. Make sure you finish it even if you start to feel better.  · Tell all sexual partners that you have a vaginal infection. They should see their health care provider and be treated if they have problems, such as a mild rash or itching.  · During treatment, it is important that you follow these instructions:  ¨ Avoid sexual activity or use condoms correctly.  ¨ Do not douche.  ¨ Avoid alcohol as directed by your health care provider.  ¨ Avoid breastfeeding as directed by your health care provider.  SEEK MEDICAL CARE IF:   · Your symptoms are not improving after 3 days of treatment.  · You have increased discharge or pain.  · You have a fever.  MAKE SURE YOU:   · Understand these instructions.  · Will watch your condition.  · Will get help right away if you are not doing well or get worse.  FOR MORE INFORMATION   Centers for Disease Control and Prevention, Division of STD Prevention: www.cdc.gov/std  American Sexual Health Association (ALEX): www.ashastd.org      This information is not intended to replace advice given to you by your health care provider. Make sure you discuss any questions you have with your health care provider.     Document Released: 12/18/2006 Document Revised: 01/08/2016 Document Reviewed: 07/30/2014  [employer] Interactive Patient Education ©2016 [employer]    Urinary Tract Infection  Urinary tract infections (UTIs) can develop anywhere along your urinary tract. Your urinary tract is your body's drainage system for removing wastes and extra water. Your urinary tract includes two kidneys, two ureters, a bladder, and a urethra. Your kidneys are a pair of bean-shaped organs. Each kidney is about the size of your fist. They are located below  your ribs, one on each side of your spine.  CAUSES  Infections are caused by microbes, which are microscopic organisms, including fungi, viruses, and bacteria. These organisms are so small that they can only be seen through a microscope. Bacteria are the microbes that most commonly cause UTIs.  SYMPTOMS   Symptoms of UTIs may vary by age and gender of the patient and by the location of the infection. Symptoms in young women typically include a frequent and intense urge to urinate and a painful, burning feeling in the bladder or urethra during urination. Older women and men are more likely to be tired, shaky, and weak and have muscle aches and abdominal pain. A fever may mean the infection is in your kidneys. Other symptoms of a kidney infection include pain in your back or sides below the ribs, nausea, and vomiting.  DIAGNOSIS  To diagnose a UTI, your caregiver will ask you about your symptoms. Your caregiver also will ask to provide a urine sample. The urine sample will be tested for bacteria and white blood cells. White blood cells are made by your body to help fight infection.  TREATMENT   Typically, UTIs can be treated with medication. Because most UTIs are caused by a bacterial infection, they usually can be treated with the use of antibiotics. The choice of antibiotic and length of treatment depend on your symptoms and the type of bacteria causing your infection.  HOME CARE INSTRUCTIONS  · If you were prescribed antibiotics, take them exactly as your caregiver instructs you. Finish the medication even if you feel better after you have only taken some of the medication.  · Drink enough water and fluids to keep your urine clear or pale yellow.  · Avoid caffeine, tea, and carbonated beverages. They tend to irritate your bladder.  · Empty your bladder often. Avoid holding urine for long periods of time.  · Empty your bladder before and after sexual intercourse.  · After a bowel movement, women should cleanse from  front to back. Use each tissue only once.  SEEK MEDICAL CARE IF:   · You have back pain.  · You develop a fever.  · Your symptoms do not begin to resolve within 3 days.  SEEK IMMEDIATE MEDICAL CARE IF:   · You have severe back pain or lower abdominal pain.  · You develop chills.  · You have nausea or vomiting.  · You have continued burning or discomfort with urination.  MAKE SURE YOU:   · Understand these instructions.  · Will watch your condition.  · Will get help right away if you are not doing well or get worse.     This information is not intended to replace advice given to you by your health care provider. Make sure you discuss any questions you have with your health care provider.     Document Released: 09/27/2006 Document Revised: 01/08/2016 Document Reviewed: 01/25/2013  [employer] Interactive Patient Education ©2016 [email or web address]

## 2017-08-22 NOTE — ED NOTES
Patient updated that she is awaiting disposition. Dr. Weir made aware that patient's results are back.

## 2017-08-22 NOTE — ED AVS SNAPSHOT
Home Care Instructions                                                                                                                Talia Salvatelli Moniz   MRN: [id number]    Department:  [facility], Emergency Dept   Date of Visit:  8/22/2017            [facility], Emergency Dept    [address] [place] [id number]    Phone:  [phone]      You were seen by     Zuleika Weir D.O.      Your Diagnosis Was     Urinary tract infection without hematuria, site unspecified     N39.0       Follow-up Information     1. Follow up with Your Physician In 2 days.    Specialty:  Emergency Medicine    Contact information    Varies          2. Follow up with [facility], Emergency Dept.    Specialty:  Emergency Medicine    Why:  If symptoms worsen    Contact information    [address] [place] [id number] [phone]      Medication Information     Review all of your home medications and newly ordered medications with your primary doctor and/or pharmacist as soon as possible. Follow medication instructions as directed by your doctor and/or pharmacist.     Please keep your complete medication list with you and share with your physician. Update the information when medications are discontinued, doses are changed, or new medications (including over-the-counter products) are added; and carry medication information at all times in the event of emergency situations.               Medication List      START taking these medications        Instructions    Morning Afternoon Evening Bedtime    cefdinir 300 MG Caps   Commonly known as:  OMNICEF        Take 1 Cap by mouth 2 times a day for 6 days.   Dose:  300 mg                        metronidazole 500 MG Tabs   Commonly known as:  FLAGYL        Take 1 Tab by mouth 2 Times a Day for 7 days.   Dose:  500 mg                          ASK your doctor about these medications        Instructions    Morning Afternoon  Evening Bedtime    lorazepam 0.5 MG Tabs   Commonly known as:  ATIVAN        Take 1 Tab by mouth every four hours as needed (vertigo).   Dose:  0.5 mg                             Where to Get Your Medications      You can get these medications from any pharmacy     Bring a paper prescription for each of these medications    - cefdinir 300 MG Caps  - metronidazole 500 MG Tabs            Procedures and tests performed during your visit     BETA-HCG QUALITATIVE URINE    CBC WITH DIFFERENTIAL    CHLAMYDIA & GC BY PCR    CMP    ESTIMATED GFR    LIPASE    REFRACTOMETER SG    Set Up for Pelvic Exam    URINALYSIS,CULTURE IF INDICATED    URINE CULTURE(NEW)    URINE MICROSCOPIC (W/UA)    WET PREP        Discharge Instructions         Bacterial Vaginosis  Bacterial vaginosis is a vaginal infection that occurs when the normal balance of bacteria in the vagina is disrupted. It results from an overgrowth of certain bacteria. This is the most common vaginal infection in women of childbearing age. Treatment is important to prevent complications, especially in pregnant women, as it can cause a premature delivery.  CAUSES   Bacterial vaginosis is caused by an increase in harmful bacteria that are normally present in smaller amounts in the vagina. Several different kinds of bacteria can cause bacterial vaginosis. However, the reason that the condition develops is not fully understood.  RISK FACTORS  Certain activities or behaviors can put you at an increased risk of developing bacterial vaginosis, including:  · Having a new sex partner or multiple sex partners.  · Douching.  · Using an intrauterine device (IUD) for contraception.  Women do not get bacterial vaginosis from toilet seats, bedding, swimming pools, or contact with objects around them.  SIGNS AND SYMPTOMS   Some women with bacterial vaginosis have no signs or symptoms. Common symptoms include:  · Grey vaginal discharge.  · A fishlike odor with discharge, especially after  sexual intercourse.  · Itching or burning of the vagina and vulva.  · Burning or pain with urination.  DIAGNOSIS   Your health care provider will take a medical history and examine the vagina for signs of bacterial vaginosis. A sample of vaginal fluid may be taken. Your health care provider will look at this sample under a microscope to check for bacteria and abnormal cells. A vaginal pH test may also be done.   TREATMENT   Bacterial vaginosis may be treated with antibiotic medicines. These may be given in the form of a pill or a vaginal cream. A second round of antibiotics may be prescribed if the condition comes back after treatment. Because bacterial vaginosis increases your risk for sexually transmitted diseases, getting treated can help reduce your risk for chlamydia, gonorrhea, HIV, and herpes.  HOME CARE INSTRUCTIONS   · Only take over-the-counter or prescription medicines as directed by your health care provider.  · If antibiotic medicine was prescribed, take it as directed. Make sure you finish it even if you start to feel better.  · Tell all sexual partners that you have a vaginal infection. They should see their health care provider and be treated if they have problems, such as a mild rash or itching.  · During treatment, it is important that you follow these instructions:  ¨ Avoid sexual activity or use condoms correctly.  ¨ Do not douche.  ¨ Avoid alcohol as directed by your health care provider.  ¨ Avoid breastfeeding as directed by your health care provider.  SEEK MEDICAL CARE IF:   · Your symptoms are not improving after 3 days of treatment.  · You have increased discharge or pain.  · You have a fever.  MAKE SURE YOU:   · Understand these instructions.  · Will watch your condition.  · Will get help right away if you are not doing well or get worse.  FOR MORE INFORMATION   Centers for Disease Control and Prevention, Division of STD Prevention: www.cdc.gov/std  American Sexual Health Association  (ALEX): www.ashastd.org      This information is not intended to replace advice given to you by your health care provider. Make sure you discuss any questions you have with your health care provider.     Document Released: 12/18/2006 Document Revised: 01/08/2016 Document Reviewed: 07/30/2014  [employer] Interactive Patient Education ©2016 [employer]    Urinary Tract Infection  Urinary tract infections (UTIs) can develop anywhere along your urinary tract. Your urinary tract is your body's drainage system for removing wastes and extra water. Your urinary tract includes two kidneys, two ureters, a bladder, and a urethra. Your kidneys are a pair of bean-shaped organs. Each kidney is about the size of your fist. They are located below your ribs, one on each side of your spine.  CAUSES  Infections are caused by microbes, which are microscopic organisms, including fungi, viruses, and bacteria. These organisms are so small that they can only be seen through a microscope. Bacteria are the microbes that most commonly cause UTIs.  SYMPTOMS   Symptoms of UTIs may vary by age and gender of the patient and by the location of the infection. Symptoms in young women typically include a frequent and intense urge to urinate and a painful, burning feeling in the bladder or urethra during urination. Older women and men are more likely to be tired, shaky, and weak and have muscle aches and abdominal pain. A fever may mean the infection is in your kidneys. Other symptoms of a kidney infection include pain in your back or sides below the ribs, nausea, and vomiting.  DIAGNOSIS  To diagnose a UTI, your caregiver will ask you about your symptoms. Your caregiver also will ask to provide a urine sample. The urine sample will be tested for bacteria and white blood cells. White blood cells are made by your body to help fight infection.  TREATMENT   Typically, UTIs can be treated with medication. Because most UTIs are caused by a bacterial  infection, they usually can be treated with the use of antibiotics. The choice of antibiotic and length of treatment depend on your symptoms and the type of bacteria causing your infection.  HOME CARE INSTRUCTIONS  · If you were prescribed antibiotics, take them exactly as your caregiver instructs you. Finish the medication even if you feel better after you have only taken some of the medication.  · Drink enough water and fluids to keep your urine clear or pale yellow.  · Avoid caffeine, tea, and carbonated beverages. They tend to irritate your bladder.  · Empty your bladder often. Avoid holding urine for long periods of time.  · Empty your bladder before and after sexual intercourse.  · After a bowel movement, women should cleanse from front to back. Use each tissue only once.  SEEK MEDICAL CARE IF:   · You have back pain.  · You develop a fever.  · Your symptoms do not begin to resolve within 3 days.  SEEK IMMEDIATE MEDICAL CARE IF:   · You have severe back pain or lower abdominal pain.  · You develop chills.  · You have nausea or vomiting.  · You have continued burning or discomfort with urination.  MAKE SURE YOU:   · Understand these instructions.  · Will watch your condition.  · Will get help right away if you are not doing well or get worse.     This information is not intended to replace advice given to you by your health care provider. Make sure you discuss any questions you have with your health care provider.     Document Released: 09/27/2006 Document Revised: 01/08/2016 Document Reviewed: 01/25/2013  [employer] Interactive Patient Education ©2016 [employer]            Patient Information     Patient Information    Following emergency treatment: all patient requiring follow-up care must return either to a private physician or a clinic if your condition worsens before you are able to obtain further medical attention, please return to the emergency room.     Billing Information    At [facility], we work  to make the billing process streamlined for our patients.  Our Representatives are here to answer any questions you may have regarding your hospital bill.  If you have insurance coverage and have supplied your insurance information to us, we will submit a claim to your insurer on your behalf.  Should you have any questions regarding your bill, we can be reached online or by phone as follows:  Online: You are able pay your bills online or live chat with our representatives about any billing questions you may have. We are here to help Monday - Friday from 8:00am to 7:30pm and 9:00am - 12:00pm on Saturdays.  Please visit [email or web address]  for more information.   Phone:  [phone] or [phone]    Please note that your emergency physician, surgeon, pathologist, radiologist, anesthesiologist, and other specialists are not employed by [facility] and will therefore bill separately for their services.  Please contact them directly for any questions concerning their bills at the numbers below:     Emergency Physician Services:  [phone]  [facility]:  [phone]  [facility]:  [phone]  [facility]:  [phone]    1. Your final bill may vary from the amount quoted upon discharge if all procedures are not complete at that time, or if your doctor has additional procedures of which we are not aware. You will receive an additional bill if you return to the Emergency Department at [facility] for suture removal regardless of the facility of which the sutures were placed.     2. Please arrange for settlement of this account at the emergency registration.    3. All self-pay accounts are due in full at the time of treatment.  If you are unable to meet this obligation then payment is expected within 4-5 days.     4. If you have had radiology studies (CT, X-ray, Ultrasound, MRI), you have received a preliminary result during your  emergency department visit. Please contact the radiology department [phone] to receive a copy of your final result. Please discuss the Final result with your primary physician or with the follow up physician provided.     Crisis Hotline:  [place] Crisis Hotline:  3-438-CWQQWUQ or 1-630.261.9311  [place] Crisis Hotline:    1-172.989.6321 or 749-372-8767         ED Discharge Follow Up Questions    1. In order to provide you with very good care, we would like to follow up with a phone call in the next few days.  May we have your permission to contact you?     YES /  NO    2. What is the best phone number to call you? (       )_____-__________    3. What is the best time to call you?      Morning  /  Afternoon  /  Evening                   Patient Signature:  ____________________________________________________________    Date:  ____________________________________________________________

## 2017-08-22 NOTE — ED NOTES
"Chief Complaint   Patient presents with   • RLQ Pain     Pt states this morning she started to have RLQ pain. Pt states, \"it feels like an ectopic pregnancy, but I know that's not possible because I've had both tubes removed.\" Pt states pain radiates to lower back and right leg. Pt states last BM was 3-4 days ago and states it was normal.   /84 mmHg  Pulse 87  Temp(Src) 36.7 °C (98.1 °F) (Temporal)  Resp 18  Ht 1.549 m (5' 1\")  Wt 64.7 kg (142 lb 10.2 oz)  BMI 26.97 kg/m2  SpO2 99%  LMP 10/01/2012 (Within Days)    "

## 2017-08-22 NOTE — ED AVS SNAPSHOT
[employer] Access Code: [email or web address]  Expires: 9/21/2017  6:46 AM    Your email address is not on file at [facility].  Email Addresses are required for you to sign up for [employer], please contact [phone] to verify your personal information and to provide your email address prior to attempting to register for [employer].    Talia Salvatelli Moniz  [address] [place] [address]    [employer]  A secure, online tool to manage your health information     [employer]’s [employer]® is a secure, online tool that connects you to your personalized health information from the privacy of your home -- day or night - making it very easy for you to manage your healthcare. Once the activation process is completed, you can even access your medical information using the [employer] frank, which is available for free in the Apple Frank store or Google Play store.     To learn more about [employer], visit [email or web address]    There are two levels of access available (as shown below):   My Chart Features  [facility] Primary Care Doctor [facility]  Specialists [facility]  Urgent  Care Non-[facility] Primary Care Doctor   Email your healthcare team securely and privately 24/7 X X X    Manage appointments: schedule your next appointment; view details of past/upcoming appointments X      Request prescription refills. X      View recent personal medical records, including lab and immunizations X X X X   View health record, including health history, allergies, medications X X X X   Read reports about your outpatient visits, procedures, consult and ER notes X X X X   See your discharge summary, which is a recap of your hospital and/or ER visit that includes your diagnosis, lab results, and care plan X X  X     How to register for [employer]:  Once your e-mail address has been verified, follow the following steps to sign up for [employer].     1. Go to  [email or web address]  2. Click on the Sign Up Now box, which takes you to the New Member Sign Up page. You  will need to provide the following information:  a. Enter your [employer] Access Code exactly as it appears at the top of this page. (You will not need to use this code after you’ve completed the sign-up process. If you do not sign up before the expiration date, you must request a new code.)   b. Enter your date of birth.   c. Enter your home email address.   d. Click Submit, and follow the next screen’s instructions.  3. Create a [employer] ID. This will be your [employer] login ID and cannot be changed, so think of one that is secure and easy to remember.  4. Create a [employer] password. You can change your password at any time.  5. Enter your Password Reset Question and Answer. This can be used at a later time if you forget your password.   6. Enter your e-mail address. This allows you to receive e-mail notifications when new information is available in [employer].  7. Click Sign Up. You can now view your health information.    For assistance activating your [employer] account, call [phone]

## 2017-08-22 NOTE — ED NOTES
"Chief Complaint   Patient presents with   • RLQ Pain     Pt was seen in triage around 2000 by this RN. Pt complaint remains the same as prior complaint.  Pt states this morning she started to have RLQ pain. Pt states, \"it feels like an ectopic pregnancy, but I know that's not possible because I've had both tubes removed.\" Pt states pain radiates to lower back and right leg. Pt states last BM was 3-4 days ago and states it was normal.   Pt denied discharge previously, but now c/o discharge and odor. Pt states discharge is yellowish or white in color.  /79 mmHg  Pulse 98  Temp(Src) 36.7 °C (98 °F) (Temporal)  Resp 16  Ht 1.549 m (5' 0.98\")  Wt 64.7 kg (142 lb 10.2 oz)  BMI 26.97 kg/m2  SpO2 98%  LMP 10/01/2012 (Within Days)    "

## 2017-08-23 LAB
C TRACH DNA SPEC QL NAA+PROBE: NEGATIVE
N GONORRHOEA DNA SPEC QL NAA+PROBE: POSITIVE
SPECIMEN SOURCE: ABNORMAL

## 2017-08-24 LAB
BACTERIA UR CULT: NORMAL
SIGNIFICANT IND 70042: NORMAL
SITE SITE: NORMAL
SOURCE SOURCE: NORMAL

## 2017-08-25 NOTE — ED NOTES
ED Positive Culture Follow-up/Notification Note:    Date: 8/24/17     Patient seen in the ED on 8/22/2017 for right lower quadrant pain x 2 days. Also states she has had a discharge with odor and her  recently cheated on her.  1. Urinary tract infection without hematuria, site unspecified    2. Acute vaginitis    3. Bacterial vaginitis     Given Azithromycin 1 g po and Rocephin 250 mg IM in the ER.     Discharge Medication List as of 8/22/2017  6:46 AM      START taking these medications    Details   metronidazole (FLAGYL) 500 MG Tab Take 1 Tab by mouth 2 Times a Day for 7 days., Disp-14 Tab, R-0, Print Rx Paper      cefdinir (OMNICEF) 300 MG Cap Take 1 Cap by mouth 2 times a day for 6 days., Disp-12 Cap, R-0, Print Rx Paper             Allergies: Review of patient's allergies indicates no known allergies.     Final cultures:   Results     Procedure Component Value Units Date/Time    URINE CULTURE(NEW) [[id number]] Collected:  08/22/17 0334    Order Status:  Completed Specimen Information:  Urine Updated:  08/24/17 1045     Significant Indicator NEG      Source UR      Site --      Urine Culture Mixed skin bradley 10-50,000 cfu/mL     CHLAMYDIA & GC BY PCR [[id number]]  (Abnormal) Collected:  08/22/17 0525    Order Status:  Completed Specimen Information:  Genital from Genital Updated:  08/23/17 1326     Source Vaginal      C. trachomatis by PCR Negative      N. gonorrhoeae by PCR POSITIVE (A)     WET PREP [[id number]] Collected:  08/22/17 0525    Order Status:  Completed Specimen Information:  Genital from Vaginal Updated:  08/22/17 0546     Significant Indicator NEG      Source GEN      Site VAGINAL      Wet Prep For Parasites --      Result:        Many WBC's seen.  Few clue cells seen.  No yeast.  No motile Trichomonas seen.      URINALYSIS,CULTURE IF INDICATED [[id number]]  (Abnormal) Collected:  08/22/17 0334    Order Status:  Completed Specimen Information:  Urine from Urine, Clean Catch Updated:  08/22/17  0350     Color Yellow      Character Clear      Specific Gravity 1.014      Ph 6.5      Glucose Negative mg/dL      Ketones Negative mg/dL      Protein Negative mg/dL      Bilirubin Negative      Urobilinogen, Urine 1.0      Nitrite Negative      Leukocyte Esterase Large (A)      Occult Blood Negative      Micro Urine Req Microscopic      Culture Indicated Yes UA Culture           Plan:   Patient has already been appropriately treated for BV and Gonorrhea.   I contacted the patient and discussed the result with her. She has picked up the metronidazole prescription and has been taking it.  She is aware that she may seek additional testing and information at the Health Dept.     Liz Rivera

## 2018-02-16 ENCOUNTER — TELEPHONE (OUTPATIENT)
Dept: RADIOLOGY | Facility: MEDICAL CENTER | Age: 38
End: 2018-02-16

## 2018-02-16 NOTE — TELEPHONE ENCOUNTER
Spoke w/ pt to roly apt @ [facility] on 2/20 @ 10:00 check in @ 9:45, reviewed prep and location

## 2018-02-20 ENCOUNTER — HOSPITAL ENCOUNTER (OUTPATIENT)
Dept: RADIOLOGY | Facility: MEDICAL CENTER | Age: 38
End: 2018-02-20
Attending: NURSE PRACTITIONER
Payer: MEDICAID

## 2018-02-20 DIAGNOSIS — N63.0 LUMP OR MASS IN BREAST: ICD-10-CM

## 2018-02-20 PROCEDURE — G0279 TOMOSYNTHESIS, MAMMO: HCPCS

## 2018-02-20 PROCEDURE — 76642 ULTRASOUND BREAST LIMITED: CPT | Mod: LT

## 2018-04-26 ENCOUNTER — HOSPITAL ENCOUNTER (OUTPATIENT)
Dept: RADIOLOGY | Facility: MEDICAL CENTER | Age: 38
End: 2018-04-26
Attending: NURSE PRACTITIONER
Payer: MEDICAID

## 2018-04-26 DIAGNOSIS — R79.89 OTHER SPECIFIED ABNORMAL FINDINGS OF BLOOD CHEMISTRY: ICD-10-CM

## 2018-04-26 PROCEDURE — 76536 US EXAM OF HEAD AND NECK: CPT

## 2021-12-27 ENCOUNTER — HOSPITAL ENCOUNTER (EMERGENCY)
Facility: MEDICAL CENTER | Age: 41
End: 2021-12-27

## 2021-12-27 VITALS
RESPIRATION RATE: 16 BRPM | WEIGHT: 154.32 LBS | DIASTOLIC BLOOD PRESSURE: 91 MMHG | TEMPERATURE: 97.6 F | HEART RATE: 106 BPM | HEIGHT: 61 IN | BODY MASS INDEX: 29.14 KG/M2 | SYSTOLIC BLOOD PRESSURE: 145 MMHG | OXYGEN SATURATION: 98 %

## 2021-12-27 PROCEDURE — 302449 STATCHG TRIAGE ONLY (STATISTIC)

## 2021-12-28 NOTE — ED TRIAGE NOTES
Chief Complaint   Patient presents with   • Ear Pain     Seen at [facility] 5 days ago for Left ear pain. Currently taking Amoxicillin and is on the 5th day without any relief. Still reporting pain and ringing in her ear. No fever     Pt ambulatory to triage for above complaint.  Pt is AO x 4, follows commands, and responds appropriately to questions. Patient's breathing is unlabored and pain is currently 4/10 on the 0-10 pain scale.  Pt placed in lobby. Patient educated on triage process and encouraged to alert staff for any changes.

## 2022-04-27 ENCOUNTER — HOSPITAL ENCOUNTER (EMERGENCY)
Facility: MEDICAL CENTER | Age: 42
End: 2022-04-27
Attending: STUDENT IN AN ORGANIZED HEALTH CARE EDUCATION/TRAINING PROGRAM
Payer: MEDICAID

## 2022-04-27 VITALS
WEIGHT: 151.9 LBS | SYSTOLIC BLOOD PRESSURE: 146 MMHG | TEMPERATURE: 98 F | HEIGHT: 61 IN | DIASTOLIC BLOOD PRESSURE: 90 MMHG | RESPIRATION RATE: 18 BRPM | HEART RATE: 92 BPM | BODY MASS INDEX: 28.68 KG/M2 | OXYGEN SATURATION: 95 %

## 2022-04-27 DIAGNOSIS — N89.8 VAGINAL DISCHARGE: Primary | ICD-10-CM

## 2022-04-27 DIAGNOSIS — K02.9 DENTAL CARIES: ICD-10-CM

## 2022-04-27 LAB
APPEARANCE UR: CLEAR
BACTERIA #/AREA URNS HPF: ABNORMAL /HPF
BACTERIA GENITAL QL WET PREP: NORMAL
BILIRUB UR QL STRIP.AUTO: NEGATIVE
COLOR UR: YELLOW
EPI CELLS #/AREA URNS HPF: ABNORMAL /HPF
GLUCOSE UR STRIP.AUTO-MCNC: NEGATIVE MG/DL
HCG UR QL: NEGATIVE
HYALINE CASTS #/AREA URNS LPF: ABNORMAL /LPF
KETONES UR STRIP.AUTO-MCNC: NEGATIVE MG/DL
LEUKOCYTE ESTERASE UR QL STRIP.AUTO: ABNORMAL
MICRO URNS: ABNORMAL
NITRITE UR QL STRIP.AUTO: NEGATIVE
PH UR STRIP.AUTO: 6.5 [PH] (ref 5–8)
PROT UR QL STRIP: NEGATIVE MG/DL
RBC # URNS HPF: ABNORMAL /HPF
RBC UR QL AUTO: NEGATIVE
SIGNIFICANT IND 70042: NORMAL
SITE SITE: NORMAL
SOURCE SOURCE: NORMAL
SP GR UR STRIP.AUTO: 1.02
WBC #/AREA URNS HPF: ABNORMAL /HPF

## 2022-04-27 PROCEDURE — 96372 THER/PROPH/DIAG INJ SC/IM: CPT

## 2022-04-27 PROCEDURE — 700102 HCHG RX REV CODE 250 W/ 637 OVERRIDE(OP): Performed by: STUDENT IN AN ORGANIZED HEALTH CARE EDUCATION/TRAINING PROGRAM

## 2022-04-27 PROCEDURE — 99284 EMERGENCY DEPT VISIT MOD MDM: CPT

## 2022-04-27 PROCEDURE — 700111 HCHG RX REV CODE 636 W/ 250 OVERRIDE (IP): Performed by: STUDENT IN AN ORGANIZED HEALTH CARE EDUCATION/TRAINING PROGRAM

## 2022-04-27 PROCEDURE — A9270 NON-COVERED ITEM OR SERVICE: HCPCS | Performed by: STUDENT IN AN ORGANIZED HEALTH CARE EDUCATION/TRAINING PROGRAM

## 2022-04-27 PROCEDURE — 81001 URINALYSIS AUTO W/SCOPE: CPT

## 2022-04-27 PROCEDURE — 81025 URINE PREGNANCY TEST: CPT

## 2022-04-27 PROCEDURE — 36415 COLL VENOUS BLD VENIPUNCTURE: CPT

## 2022-04-27 RX ORDER — DOXYCYCLINE 100 MG/1
100 CAPSULE ORAL 2 TIMES DAILY
Qty: 14 CAPSULE | Refills: 0 | Status: SHIPPED | OUTPATIENT
Start: 2022-04-27 | End: 2022-04-27 | Stop reason: SDUPTHER

## 2022-04-27 RX ORDER — PENICILLIN V POTASSIUM 500 MG/1
500 TABLET ORAL 4 TIMES DAILY
Qty: 40 TABLET | Refills: 0 | Status: SHIPPED | OUTPATIENT
Start: 2022-04-27 | End: 2022-04-27 | Stop reason: SDUPTHER

## 2022-04-27 RX ORDER — PENICILLIN V POTASSIUM 500 MG/1
500 TABLET ORAL 4 TIMES DAILY
Qty: 40 TABLET | Refills: 0 | Status: SHIPPED | OUTPATIENT
Start: 2022-04-27 | End: 2023-06-16

## 2022-04-27 RX ORDER — CEFTRIAXONE 500 MG/1
500 INJECTION, POWDER, FOR SOLUTION INTRAMUSCULAR; INTRAVENOUS ONCE
Status: COMPLETED | OUTPATIENT
Start: 2022-04-27 | End: 2022-04-27

## 2022-04-27 RX ORDER — DOXYCYCLINE 100 MG/1
100 TABLET ORAL ONCE
Status: COMPLETED | OUTPATIENT
Start: 2022-04-27 | End: 2022-04-27

## 2022-04-27 RX ORDER — DOXYCYCLINE 100 MG/1
100 CAPSULE ORAL 2 TIMES DAILY
Qty: 14 CAPSULE | Refills: 0 | Status: SHIPPED | OUTPATIENT
Start: 2022-04-27 | End: 2023-06-16

## 2022-04-27 RX ADMIN — CEFTRIAXONE SODIUM 500 MG: 500 INJECTION, POWDER, FOR SOLUTION INTRAMUSCULAR; INTRAVENOUS at 04:11

## 2022-04-27 RX ADMIN — DOXYCYCLINE 100 MG: 100 TABLET, FILM COATED ORAL at 04:11

## 2022-04-27 ASSESSMENT — ENCOUNTER SYMPTOMS
SHORTNESS OF BREATH: 0
NECK PAIN: 0
FEVER: 0
VOMITING: 0
DOUBLE VISION: 0
CHILLS: 0
HEADACHES: 0
BLURRED VISION: 0
FALLS: 0
LOSS OF CONSCIOUSNESS: 0
NAUSEA: 0
ABDOMINAL PAIN: 0
SORE THROAT: 0
COUGH: 0

## 2022-04-27 ASSESSMENT — LIFESTYLE VARIABLES
HAVE YOU EVER FELT YOU SHOULD CUT DOWN ON YOUR DRINKING: NO
TOTAL SCORE: 0
DO YOU DRINK ALCOHOL: YES
TOTAL SCORE: 0
EVER HAD A DRINK FIRST THING IN THE MORNING TO STEADY YOUR NERVES TO GET RID OF A HANGOVER: NO
EVER FELT BAD OR GUILTY ABOUT YOUR DRINKING: NO
CONSUMPTION TOTAL: INCOMPLETE
HAVE PEOPLE ANNOYED YOU BY CRITICIZING YOUR DRINKING: NO
TOTAL SCORE: 0

## 2022-04-27 NOTE — ED NOTES
Main lab called that they were unable to run a chlamydia test on the specimen they received. Dr. Rosio KAM this am, notified and he asked that this pt be called and told to get this test ather PCP or Health department. No answer when phone contact number called.

## 2022-04-27 NOTE — ED TRIAGE NOTES
"Chief Complaint   Patient presents with   • Vaginal Discharge     Vaginal yellow d/c x2 weeks, worsening in the last two days.  Vaginal bleeding-spotting for x1.  Tooth pain x1 week.  Came into ED d/ t worsening symptoms.       /90   Pulse (!) 107   Temp 36.1 °C (97 °F) (Temporal)   Resp 20   Ht 1.549 m (5' 1\")   Wt 68.9 kg (151 lb 14.4 oz)   SpO2 100%   BMI 28.70 kg/m²     Not covid vaccinated.  "

## 2022-04-27 NOTE — ED PROVIDER NOTES
ED Provider Note    Chief Complaint:   Vaginal discharge    HPI:  Talia Rose Salvatelli Moniz is a very pleasant 41-year-old female with an IUD who presents with yellow vaginal discharge for the past 2 weeks worsening for the past 2 days with some spotting for 1 day.  Patient also endorses dental pain for the past week.  Patient denies dysuria or hematuria.  Patient denies abdominal discomfort nausea vomiting, fevers or chills.  Patient reports history of chlamydia and gonorrhea that have been treated previously.  Patient endorses multiple sexual partners in the last 6 months and never using condoms.    Review of Systems:  Review of Systems   Constitutional: Negative for chills and fever.   HENT: Negative for congestion and sore throat.    Eyes: Negative for blurred vision and double vision.   Respiratory: Negative for cough and shortness of breath.    Cardiovascular: Negative for chest pain and leg swelling.   Gastrointestinal: Negative for abdominal pain, nausea and vomiting.   Genitourinary: Negative for dysuria and hematuria.   Musculoskeletal: Negative for falls and neck pain.   Skin: Negative for itching and rash.   Neurological: Negative for loss of consciousness and headaches.       Past Medical History:   has a past medical history of Bipolar affective disorder (HCC) (Dx 2008), Bipolar disorder (HCC) (2008), Blood transfusion, Hepatitis C, Hepatitis C (3/22/2010), Hepatitis C, Infectious disease, and Substance abuse (HCC).    Social History:  Social History     Tobacco Use   • Smoking status: Current Every Day Smoker     Packs/day: 1.00     Years: 19.00     Pack years: 19.00     Types: Cigarettes   • Smokeless tobacco: Never Used   Substance and Sexual Activity   • Alcohol use: Yes     Comment: rare   • Drug use: Yes     Types: Methamphetamines     Comment: meth 13 years, Quit 3 yrs ago   • Sexual activity: Yes     Partners: Male     Comment: None       Surgical History:   has a past surgical history that  "includes other abdominal surgery; abdominal exploration; and pelviscopy (1/28/2014).    Allergies:  No Known Allergies    Physical Exam:  Vital Signs: /90   Pulse 92   Temp 36.7 °C (98 °F)   Resp 18   Ht 1.549 m (5' 1\")   Wt 68.9 kg (151 lb 14.4 oz)   SpO2 95%   BMI 28.70 kg/m²   Physical Exam  Vitals and nursing note reviewed.   Constitutional:       Comments: Patient is lying in bed supine, pleasant, conversant, speaking in complete sentences   HENT:      Head: Normocephalic and atraumatic.      Mouth/Throat:      Comments: Poor dentition, no molars present, premolars on the lower teeth have fractured and obvious dental caries, no facial swelling  Eyes:      Extraocular Movements: Extraocular movements intact.      Conjunctiva/sclera: Conjunctivae normal.      Pupils: Pupils are equal, round, and reactive to light.   Cardiovascular:      Pulses: Normal pulses.      Comments:   Pulmonary:      Effort: Pulmonary effort is normal. No respiratory distress.   Abdominal:      Comments: Abdomen is soft, non-tender, non-distended, non-rigid, no rebound, guarding, masses, no McBurney's point tenderness, no peritoneal signs, negative Rovsing sign, negative Torres sign.  No CVA tenderness to palpation. Benign abdomen.   Genitourinary:     Comments: No CMT, purulent discharge from the cervical os, IUD strings are present  Musculoskeletal:         General: No swelling. Normal range of motion.      Cervical back: Normal range of motion. No rigidity.   Skin:     General: Skin is warm and dry.      Capillary Refill: Capillary refill takes less than 2 seconds.   Neurological:      Mental Status: She is alert.         Medical records reviewed for continuity of care.     Results for orders placed or performed during the hospital encounter of 04/27/22   WET PREP    Specimen: Cervical; Genital   Result Value Ref Range    Significant Indicator NEG     Source GEN     Site CERVICAL     Wet Prep For Parasites       No " yeast.  No motile Trichomonas seen.  Few clue cells seen.  Few WBCs seen.     Chlamydia & N.gonorrhoeae by PCR    Specimen: Genital   Result Value Ref Range    Source Vaginal    HCG QUALITATIVE UR   Result Value Ref Range    Beta-Hcg Urine Negative Negative   URINALYSIS    Specimen: Urine, Cath; Genital   Result Value Ref Range    Color Yellow     Character Clear     Specific Gravity 1.025 <1.035    Ph 6.5 5.0 - 8.0    Glucose Negative Negative mg/dL    Ketones Negative Negative mg/dL    Protein Negative Negative mg/dL    Bilirubin Negative Negative    Nitrite Negative Negative    Leukocyte Esterase Trace (A) Negative    Occult Blood Negative Negative    Micro Urine Req Microscopic    URINE MICROSCOPIC (W/UA)   Result Value Ref Range    WBC 5-10 (A) /hpf    RBC 0-2 /hpf    Bacteria Few (A) None /hpf    Epithelial Cells Few Few /hpf    Hyaline Cast 0-2 /lpf       Radiology:  No orders to display        MDM:  Patient has no evidence of peritonsillar abscess, airway compromise or difficulty handling secretions.  No evidence of Jc angina.  Patient will be treated with antibiotics outpatient and encouraged to follow-up with dentistry for further management.  Urinalysis to evaluate for UTI.  Wet prep to evaluate for BV, trichomonas, candida.  hCG to evaluate for IUP versus ectopic.  Chlamydia and gonorrhea PCR will be required during pelvic exam.  No abdominal tenderness to palpation, acute intra-abdominal process is inconsistent with patient presentation at this time.  Disposition pending pelvic exam.    Electronically signed by: Ubaldo Skelton M.D., 4/27/2022, 3:13 AM    Patient instructed to follow-up with dentistry for dental caries, patient given penicillin for dental caries as well.   exam demonstrates purulent discharge from the cervical os, follow-up with gynecology has been recommended and a referral has been placed.  Patient has been given Rocephin and 1 dose of doxycycline and doxycycline to go  home with to treat cervicitis.  Wet prep unremarkable, no treatment necessary.    Repeat physical exam benign.  I doubt any serious emergency process at this time.  Patient and/or family, friends given strict return precautions and care instructions. They have demonstrated understanding of discharge instructions through teach back mechanism. Advised PCP follow-up in 1-2 days.  Patient/family/friend expresses understanding and agrees to plan.    This dictation has been created using voice recognition software. I am continuously working with the software to minimize the number of voice recognition errors and I have made every attempt to manually correct the errors within my dictation. However errors  related to this voice recognition software may still exist and should be interpreted within the appropriate context.     Electronically signed by: Ubaldo Skelton M.D., 4/27/2022 4:23 AM      Disposition:  Home    Final Impression:  1. Vaginal discharge    2. Dental caries

## 2022-04-28 NOTE — DISCHARGE PLANNING
note:  Received a message from RN to call pt because lab was unable to run her chlamydia test. MD apparently wants pt to follow up [facility].    Pt does not have a phone number on facesheet and CM attempted to call mother @ [phone] but this number is not a working number.

## 2022-05-26 ENCOUNTER — APPOINTMENT (OUTPATIENT)
Dept: URGENT CARE | Facility: CLINIC | Age: 42
End: 2022-05-26
Payer: MEDICAID

## 2022-05-26 ENCOUNTER — HOSPITAL ENCOUNTER (EMERGENCY)
Facility: MEDICAL CENTER | Age: 42
End: 2022-05-26
Attending: EMERGENCY MEDICINE
Payer: MEDICAID

## 2022-05-26 VITALS
TEMPERATURE: 97 F | HEART RATE: 90 BPM | HEIGHT: 61 IN | BODY MASS INDEX: 27.97 KG/M2 | DIASTOLIC BLOOD PRESSURE: 80 MMHG | RESPIRATION RATE: 16 BRPM | SYSTOLIC BLOOD PRESSURE: 145 MMHG | OXYGEN SATURATION: 98 % | WEIGHT: 148.15 LBS

## 2022-05-26 DIAGNOSIS — K08.89 PAIN, DENTAL: ICD-10-CM

## 2022-05-26 PROCEDURE — 99283 EMERGENCY DEPT VISIT LOW MDM: CPT

## 2022-05-26 RX ORDER — GABAPENTIN 100 MG/1
100 CAPSULE ORAL 3 TIMES DAILY
Qty: 30 CAPSULE | Refills: 0 | Status: SHIPPED | OUTPATIENT
Start: 2022-05-26 | End: 2023-09-15

## 2022-05-27 NOTE — ED TRIAGE NOTES
Talia Rose Salvatelli Moniz  41 y.o. female    Chief Complaint   Patient presents with   • Dental Pain     Pt arrives with complaints of bilateral lower dental pain that began a few weeks ago and has worsened. Pt states she feels like her jaw is swollen. No visible swelling seen on triage. Was seen few weeks prior for similar pain, did not take abx at time of prescription, however started taking penicillin today    Endorses chills. Denies vomiting.     Vitals:    05/26/22 2155   BP: (!) 148/89   Pulse: 94   Resp: 18   Temp: 36.3 °C (97.3 °F)   SpO2: 100%       Triage process explained to patient, apologized for wait time, and returned to lobby.  Pt informed to notify staff of any change in condition.

## 2022-05-27 NOTE — ED NOTES
Discharge instructions provided to pt.  ERP went over discharge instructions with pt.  Pt instructed to follow up with dental care and to  prescriptions from pharmacy.  Pt verbalized understanding.  Instructed to return to Emergency Department for new or worsening symptoms.

## 2022-05-27 NOTE — ED PROVIDER NOTES
ED Provider Note    CHIEF COMPLAINT  Chief Complaint   Patient presents with   • Dental Pain       HPI  Talia Rose Salvatelli Moniz is a 41 y.o. female who presents to the emergency department for acute on chronic dental pain. States that she previously had upper plates however she is not been wearing these. She has also previously received a dental care at [facility]. [facility] get [facility] it is increasing dental pain currently however they stated that they had at least a six month wait. Due to this prolonged wait time she decided come the emergency room for further care. She does that her gums and lower face just hurt in general. No significant new changes. No new changes to remaining lower mandibular teeth.    REVIEW OF SYSTEMS  See HPI for further details. All other systems are negative.     PAST MEDICAL HISTORY   has a past medical history of Bipolar affective disorder (HCC) (Dx 2008), Bipolar disorder (HCC) (2008), Blood transfusion, Hepatitis C, Hepatitis C (3/22/2010), Hepatitis C, Infectious disease, and Substance abuse (HCC).    SOCIAL HISTORY  Social History     Tobacco Use   • Smoking status: Current Every Day Smoker     Packs/day: 1.00     Years: 19.00     Pack years: 19.00     Types: Cigarettes   • Smokeless tobacco: Never Used   Substance and Sexual Activity   • Alcohol use: Yes     Comment: rare   • Drug use: Not Currently     Types: Methamphetamines     Comment: meth 13 years, Quit 3 yrs ago   • Sexual activity: Yes     Partners: Male     Comment: None       SURGICAL HISTORY   has a past surgical history that includes other abdominal surgery; abdominal exploration; and pelviscopy (1/28/2014).    CURRENT MEDICATIONS  Home Medications     Reviewed by Mary Griffin R.N. (Registered Nurse) on 05/26/22 at 2208  Med List Status: Not Addressed   Medication Last Dose Status   doxycycline (MONODOX) 100 MG capsule  Active   lorazepam (ATIVAN) 0.5 MG TABS  Active  "  penicillin v potassium (VEETID) 500 MG Tab  Active                ALLERGIES  No Known Allergies    PHYSICAL EXAM  VITAL SIGNS: BP (!) 145/80   Pulse 90   Temp 36.1 °C (97 °F) (Temporal)   Resp 16   Ht 1.549 m (5' 1\")   Wt 67.2 kg (148 lb 2.4 oz)   SpO2 98%   BMI 27.99 kg/m²  @[id number]::@  Pulse ox interpretation: I interpret this pulse ox as normal.  Constitutional: Alert in no apparent distress.  HENT: Normocephalic, Atraumatic, Bilateral external ears normal. Nose normal. Poor dentition throughout. Minimal remaining mandibular teeth. No significant gingival inflammatory process. No identifiable abscess or cellulitis.  Eyes: Pupils are equal and reactive.   Heart: Regular rate and rythm, no murmurs.    Lungs: Clear to auscultation bilaterally.  Skin: Warm, Dry, No erythema, No rash.   Neurologic: Alert, Grossly non-focal.   Psychiatric: Affect normal, Judgment normal, Mood normal, Appears appropriate and not intoxicated.           COURSE & MEDICAL DECISION MAKING  Pertinent Labs & Imaging studies reviewed. (See chart for details)    41-year-old female presented emergency room with acute on chronic dental pain. At this point I will start her on some gabapentin to see if this is not associated with some likely chronic nerve pain. At this point do not see any acute inflammatory process to suggest need for antibiotics. She is understanding again that she should continue to [facility]-up with [facility] or even the [facility] as referred to get further outpatient dental care. The patient will return for worsening symptoms and is stable at the time of discharge. The patient verbalizes understanding and will comply.    FINAL IMPRESSION  1. Pain, dental               Electronically signed by: Roberto Doherty M.D., 5/27/2022 5:47 AM        "

## 2023-06-16 ENCOUNTER — OFFICE VISIT (OUTPATIENT)
Dept: URGENT CARE | Facility: CLINIC | Age: 43
End: 2023-06-16
Payer: MEDICAID

## 2023-06-16 ENCOUNTER — HOSPITAL ENCOUNTER (OUTPATIENT)
Facility: MEDICAL CENTER | Age: 43
End: 2023-06-16
Payer: MEDICAID

## 2023-06-16 VITALS
OXYGEN SATURATION: 98 % | WEIGHT: 141.7 LBS | HEIGHT: 61 IN | SYSTOLIC BLOOD PRESSURE: 120 MMHG | BODY MASS INDEX: 26.75 KG/M2 | HEART RATE: 96 BPM | TEMPERATURE: 98.2 F | RESPIRATION RATE: 16 BRPM | DIASTOLIC BLOOD PRESSURE: 68 MMHG

## 2023-06-16 DIAGNOSIS — N89.8 VAGINAL DISCHARGE: ICD-10-CM

## 2023-06-16 LAB
APPEARANCE UR: NORMAL
BILIRUB UR STRIP-MCNC: NEGATIVE MG/DL
COLOR UR AUTO: YELLOW
GLUCOSE UR STRIP.AUTO-MCNC: NEGATIVE MG/DL
KETONES UR STRIP.AUTO-MCNC: NEGATIVE MG/DL
LEUKOCYTE ESTERASE UR QL STRIP.AUTO: NEGATIVE
NITRITE UR QL STRIP.AUTO: NEGATIVE
PH UR STRIP.AUTO: 5.5 [PH] (ref 5–8)
POCT INT CON NEG: NEGATIVE
POCT INT CON POS: POSITIVE
POCT URINE PREGNANCY TEST: NEGATIVE
PROT UR QL STRIP: NEGATIVE MG/DL
RBC UR QL AUTO: NEGATIVE
SP GR UR STRIP.AUTO: >=1.03
UROBILINOGEN UR STRIP-MCNC: NORMAL MG/DL

## 2023-06-16 PROCEDURE — 87660 TRICHOMONAS VAGIN DIR PROBE: CPT

## 2023-06-16 PROCEDURE — 81002 URINALYSIS NONAUTO W/O SCOPE: CPT

## 2023-06-16 PROCEDURE — 87491 CHLMYD TRACH DNA AMP PROBE: CPT

## 2023-06-16 PROCEDURE — 87591 N.GONORRHOEAE DNA AMP PROB: CPT

## 2023-06-16 PROCEDURE — 3074F SYST BP LT 130 MM HG: CPT

## 2023-06-16 PROCEDURE — 81025 URINE PREGNANCY TEST: CPT

## 2023-06-16 PROCEDURE — 87510 GARDNER VAG DNA DIR PROBE: CPT

## 2023-06-16 PROCEDURE — 99213 OFFICE O/P EST LOW 20 MIN: CPT

## 2023-06-16 PROCEDURE — 87480 CANDIDA DNA DIR PROBE: CPT

## 2023-06-16 PROCEDURE — 3078F DIAST BP <80 MM HG: CPT

## 2023-06-16 ASSESSMENT — ENCOUNTER SYMPTOMS
FLANK PAIN: 0
NAUSEA: 0
DIAPHORESIS: 0
WEIGHT LOSS: 0
VOMITING: 0
ABDOMINAL PAIN: 0
CHILLS: 0
FEVER: 0

## 2023-06-16 ASSESSMENT — FIBROSIS 4 INDEX: FIB4 SCORE: 0.33

## 2023-06-17 DIAGNOSIS — B96.89 BACTERIAL VAGINOSIS: ICD-10-CM

## 2023-06-17 DIAGNOSIS — N76.0 BACTERIAL VAGINOSIS: ICD-10-CM

## 2023-06-17 LAB
C TRACH DNA GENITAL QL NAA+PROBE: NEGATIVE
CANDIDA DNA VAG QL PROBE+SIG AMP: NEGATIVE
G VAGINALIS DNA VAG QL PROBE+SIG AMP: POSITIVE
N GONORRHOEA DNA GENITAL QL NAA+PROBE: NEGATIVE
SPECIMEN SOURCE: NORMAL
T VAGINALIS DNA VAG QL PROBE+SIG AMP: NEGATIVE

## 2023-06-17 RX ORDER — METRONIDAZOLE 500 MG/1
500 TABLET ORAL 2 TIMES DAILY
Qty: 14 TABLET | Refills: 0 | Status: SHIPPED | OUTPATIENT
Start: 2023-06-17 | End: 2023-06-24

## 2023-06-17 NOTE — PROGRESS NOTES
Subjective:   Talia Rose Salvatelli Moniz is a 43 y.o. female who presents for Sexually Transmitted Diseases (The patient isn't sure if she has an STD or vaginitis so she wants to be tested for both. Yellow watery discharge.)      HPI: This is a 40-year-old female presents today for STD screening.  Patient reports developing vaginal discharge, vaginal itching, and discomfort over the last few months.  She denies known exposures to STDs.  She denies urinary symptoms.  No vaginal lesions.  She denies fevers, chills, body aches, abdominal pain, back pain.  No other complaints to report today.  She reports history of bacterial vaginosis with similar presentation in the past.      Review of Systems   Constitutional:  Negative for chills, diaphoresis, fever, malaise/fatigue and weight loss.   Gastrointestinal:  Negative for abdominal pain, nausea and vomiting.   Genitourinary:  Negative for dysuria, flank pain, frequency, hematuria and urgency.        +vaginal discharge   All other systems reviewed and are negative.      Medications:    Current Outpatient Medications on File Prior to Visit   Medication Sig Dispense Refill    gabapentin (NEURONTIN) 100 MG Cap Take 1 Capsule by mouth 3 times a day. (Patient not taking: Reported on 6/16/2023) 30 Capsule 0    doxycycline (MONODOX) 100 MG capsule Take 1 Capsule by mouth 2 times a day. (Patient not taking: Reported on 6/16/2023) 14 Capsule 0    penicillin v potassium (VEETID) 500 MG Tab Take 1 Tablet by mouth 4 times a day. (Patient not taking: Reported on 6/16/2023) 40 Tablet 0    lorazepam (ATIVAN) 0.5 MG TABS Take 1 Tab by mouth every four hours as needed (vertigo). (Patient not taking: Reported on 6/16/2023) 10 Tab 0     No current facility-administered medications on file prior to visit.        Allergies:   Patient has no known allergies.    Problem List:   Patient Active Problem List   Diagnosis    Hepatitis C-no treatment, dx 1999, IV drug hx.    Bipolar affective  "disorder (HCC)    Tobacco dependence-smokes 1/2 ppd     N&V (nausea and vomiting)    Drug use - Meth - clean 3 yrs    Hx of ectopic pregnancy - 1999    H/O transfusion - 1999    Constipation in pregnancy    Encounter for supervision of other normal pregnancy    Labor and delivery, indication for care        Surgical History:  Past Surgical History:   Procedure Laterality Date    PELVISCOPY  1/28/2014    Performed by Katrin Castro M.D. at [facility]    ABDOMINAL EXPLORATION      eptopic oreg 1999    OTHER ABDOMINAL SURGERY      etopic preg 1999       Past Social Hx:   Social History     Tobacco Use    Smoking status: Every Day     Packs/day: 1.00     Years: 19.00     Pack years: 19.00     Types: Cigarettes    Smokeless tobacco: Never   Vaping Use    Vaping Use: Never used   Substance Use Topics    Alcohol use: Yes     Comment: rare    Drug use: Not Currently     Types: Methamphetamines     Comment: meth 13 years, Quit 3 yrs ago          Problem list, medications, and allergies reviewed by myself today in Epic.     Objective:     /68 (BP Location: Left arm, Patient Position: Sitting, BP Cuff Size: Adult)   Pulse 96   Temp 36.8 °C (98.2 °F) (Temporal)   Resp 16   Ht 1.549 m (5' 1\")   Wt 64.3 kg (141 lb 11.2 oz)   SpO2 98%   BMI 26.77 kg/m²     Physical Exam  Vitals and nursing note reviewed.   Constitutional:       General: She is not in acute distress.     Appearance: Normal appearance. She is normal weight. She is not ill-appearing, toxic-appearing or diaphoretic.   HENT:      Head: Normocephalic and atraumatic.   Cardiovascular:      Rate and Rhythm: Normal rate and regular rhythm.      Pulses: Normal pulses.      Heart sounds: Normal heart sounds. No murmur heard.     No friction rub. No gallop.   Pulmonary:      Effort: Pulmonary effort is normal. No respiratory distress.      Breath sounds: Normal breath sounds. No stridor. No wheezing, rhonchi or rales.   Chest:      Chest wall: No " tenderness.   Abdominal:      General: Abdomen is flat. Bowel sounds are normal.      Palpations: Abdomen is soft.      Tenderness: There is no abdominal tenderness. There is no right CVA tenderness or left CVA tenderness.   Genitourinary:     Comments: Deferred patient denies vaginal lesions  Skin:     General: Skin is warm and dry.      Capillary Refill: Capillary refill takes less than 2 seconds.   Neurological:      General: No focal deficit present.      Mental Status: She is alert and oriented to person, place, and time. Mental status is at baseline.      Gait: Gait normal.   Psychiatric:         Mood and Affect: Mood normal.         Behavior: Behavior normal.         Thought Content: Thought content normal.         Judgment: Judgment normal.         Assessment/Plan:     Diagnosis and associated orders:   1. Vaginal discharge  POCT Urinalysis    POCT PREGNANCY    VAGINAL PATHOGENS DNA PANEL    Chlamydia/GC, PCR (Genital/Anal swab)             Comments/MDM:   Pt is clinically stable at today's acute urgent care visit.  No acute distress noted. Appropriate for outpatient management at this time.     Acute problem.  POC UA unremarkable.  POC pregnancy negative.  Vaginal swabs obtained today for GC chlamydia testing and vaginal pathogens panel.  I will follow-up on results, and treat as indicated.  At this time I am recommending patient to refrain from any unprotected sex until receiving results. Patient is to return to UC or go to ER for any new or worsening signs or symptoms, and follow with with PCP for recheck. Patient is agreeable with plan of care and verbalizes good understanding.             Discussed DDx, management options (risks,benefits, and alternatives to planned treatment), natural progression and supportive care.  Expressed understanding and the treatment plan was agreed upon. Questions were encouraged and answered   Return to urgent care prn if new or worsening sx or if there is no improvement in  condition prn.    Educated in Red flags and indications to immediately call 911 or present to the Emergency Department.   Advised the patient to follow-up with the primary care physician for recheck, reevaluation, and consideration of further management.    I personally reviewed prior external notes and test results pertinent to today's visit.  I have independently reviewed and interpreted all diagnostics ordered during this urgent care acute visit.         Please note that this dictation was created using voice recognition software. I have made a reasonable attempt to correct obvious errors, but I expect that there are errors of grammar and possibly content that I did not discover before finalizing the note.    This note was electronically signed by GAUDENCIO Lucio

## 2023-09-15 ENCOUNTER — HOSPITAL ENCOUNTER (OUTPATIENT)
Facility: MEDICAL CENTER | Age: 43
End: 2023-09-15
Attending: NURSE PRACTITIONER
Payer: MEDICAID

## 2023-09-15 ENCOUNTER — OFFICE VISIT (OUTPATIENT)
Dept: URGENT CARE | Facility: CLINIC | Age: 43
End: 2023-09-15
Payer: MEDICAID

## 2023-09-15 VITALS
DIASTOLIC BLOOD PRESSURE: 84 MMHG | OXYGEN SATURATION: 100 % | HEIGHT: 61 IN | BODY MASS INDEX: 26.43 KG/M2 | TEMPERATURE: 97.8 F | HEART RATE: 92 BPM | WEIGHT: 140 LBS | RESPIRATION RATE: 16 BRPM | SYSTOLIC BLOOD PRESSURE: 122 MMHG

## 2023-09-15 DIAGNOSIS — Z86.69 HISTORY OF MIGRAINE: ICD-10-CM

## 2023-09-15 DIAGNOSIS — M25.441 FINGER JOINT SWELLING, RIGHT: ICD-10-CM

## 2023-09-15 DIAGNOSIS — N89.8 VAGINAL DISCHARGE: ICD-10-CM

## 2023-09-15 PROCEDURE — 87491 CHLMYD TRACH DNA AMP PROBE: CPT

## 2023-09-15 PROCEDURE — 3079F DIAST BP 80-89 MM HG: CPT | Performed by: NURSE PRACTITIONER

## 2023-09-15 PROCEDURE — 3074F SYST BP LT 130 MM HG: CPT | Performed by: NURSE PRACTITIONER

## 2023-09-15 PROCEDURE — 87660 TRICHOMONAS VAGIN DIR PROBE: CPT

## 2023-09-15 PROCEDURE — 87480 CANDIDA DNA DIR PROBE: CPT

## 2023-09-15 PROCEDURE — 87510 GARDNER VAG DNA DIR PROBE: CPT

## 2023-09-15 PROCEDURE — 87591 N.GONORRHOEAE DNA AMP PROB: CPT

## 2023-09-15 PROCEDURE — 99214 OFFICE O/P EST MOD 30 MIN: CPT | Performed by: NURSE PRACTITIONER

## 2023-09-15 RX ORDER — METRONIDAZOLE 7.5 MG/G
1 GEL TOPICAL 2 TIMES DAILY
Qty: 45 G | Refills: 0 | Status: SHIPPED | OUTPATIENT
Start: 2023-09-15 | End: 2023-09-22

## 2023-09-15 ASSESSMENT — FIBROSIS 4 INDEX: FIB4 SCORE: 0.33

## 2023-09-16 LAB
CANDIDA DNA VAG QL PROBE+SIG AMP: NEGATIVE
G VAGINALIS DNA VAG QL PROBE+SIG AMP: POSITIVE
T VAGINALIS DNA VAG QL PROBE+SIG AMP: NEGATIVE

## 2023-09-16 ASSESSMENT — ENCOUNTER SYMPTOMS
HEADACHES: 0
NAUSEA: 0
DIZZINESS: 0
SHORTNESS OF BREATH: 0
SORE THROAT: 0
LOSS OF CONSCIOUSNESS: 0
FEVER: 0
EYE PAIN: 0
VOMITING: 0
MYALGIAS: 0
CHILLS: 0

## 2023-09-16 NOTE — PROGRESS NOTES
Subjective:   Talia Rose Salvatelli Moniz is a 43 y.o. female who presents for Vaginal Itching (Completed medication and feels like BV has not improved wondering about gel medication, red bump on right hand second finger, only growing and painful, migraines, right pupil is bigger than the left pupil and concerned)      HPI  Patient is a 43-year-old female presents the urgent care with a couple concerns first being vaginal itching with discharge and she is concerned she may have BV.  Patient has a history of BV has taken oral medications most recent recently was in June.  She denies any potential STI exposures has no burning with urination.  She is also concerned that she has developed a red bump on her right hand at the second finger at the joint.  Has no pain.  No injury.  Concerned that she may have rheumatoid arthritis.  Does have an underlying autoimmune disorder with her thyroid currently she is without a PCP.  Patient also has been intermittently experiencing migraine headaches when she has these headaches she notices a pupil being larger than the other.  She has been evaluated previously by her providers however no concerns of any neurological deficits.  Patient has had no recent trauma or head injuries.  Patient does not have a headache on today's exam.    Review of Systems   Constitutional:  Negative for chills and fever.   HENT:  Negative for sore throat.    Eyes:  Negative for pain.   Respiratory:  Negative for shortness of breath.    Cardiovascular:  Negative for chest pain.   Gastrointestinal:  Negative for nausea and vomiting.   Genitourinary:  Negative for hematuria.        Vaginal discharge   Musculoskeletal:  Negative for myalgias.        Right second finger swelling   Skin:  Negative for rash.   Neurological:  Negative for dizziness, loss of consciousness and headaches.       Medications:    metronidazole    Allergies: Patient has no known allergies.    Problem List: Talia Rose Salvatelli Moniz  "does not have any pertinent problems on file.    Surgical History:  Past Surgical History:   Procedure Laterality Date    PELVISCOPY  1/28/2014    Performed by Katrin Castro M.D. at SURGERY [facility] ORS    ABDOMINAL EXPLORATION      eptopic oreg 1999    OTHER ABDOMINAL SURGERY      etopic preg 1999       Past Social Hx: Talia Rose Salvatelli Moniz  reports that she has been smoking cigarettes. She has a 19.0 pack-year smoking history. She has never used smokeless tobacco. She reports current alcohol use. She reports that she does not currently use drugs after having used the following drugs: Methamphetamines.     Past Family Hx:  Talia Rose Salvatelli Moniz family history includes Cancer in her father and maternal grandmother; Lung Disease in her father; Psychiatric Illness in her maternal grandfather and mother.     Problem list, medications, and allergies reviewed by myself today in Epic.     Objective:     /84   Pulse 92   Temp 36.6 °C (97.8 °F) (Temporal)   Resp 16   Ht 1.549 m (5' 1\")   Wt 63.5 kg (140 lb)   SpO2 100%   BMI 26.45 kg/m²     Physical Exam  Vitals and nursing note reviewed.   Constitutional:       General: She is not in acute distress.     Appearance: She is well-developed.   HENT:      Head: Normocephalic and atraumatic.      Right Ear: External ear normal.      Left Ear: External ear normal.      Nose: Nose normal.      Mouth/Throat:      Mouth: Mucous membranes are moist.   Eyes:      Conjunctiva/sclera: Conjunctivae normal.   Neck:      Meningeal: Brudzinski's sign and Kernig's sign absent.   Cardiovascular:      Rate and Rhythm: Normal rate.   Pulmonary:      Effort: Pulmonary effort is normal. No respiratory distress.      Breath sounds: Normal breath sounds.   Abdominal:      General: There is no distension.   Genitourinary:     Comments: Deferred exam  Musculoskeletal:         General: Normal range of motion.      Right hand: Swelling present. No deformity, tenderness " or bony tenderness. Normal range of motion. Normal strength. Normal sensation. There is no disruption of two-point discrimination. Normal capillary refill. Normal pulse.      Cervical back: Full passive range of motion without pain.      Comments: Swelling of the DIP of the right second phalanx, nontender to palpation.   Skin:     General: Skin is warm and dry.   Neurological:      General: No focal deficit present.      Mental Status: She is alert and oriented to person, place, and time. Mental status is at baseline. She is not disoriented.      GCS: GCS eye subscore is 4. GCS verbal subscore is 5. GCS motor subscore is 6.      Cranial Nerves: No cranial nerve deficit.      Sensory: No sensory deficit.      Gait: Gait (gait at baseline) normal.      Deep Tendon Reflexes: Reflexes are normal and symmetric.   Psychiatric:         Judgment: Judgment normal.         Assessment/Plan:     Diagnosis and associated orders:     1. Vaginal discharge  VAGINAL PATHOGENS DNA PANEL    Chlamydia/GC, PCR (Genital/Anal swab)    metronidazole (METROGEL) 0.75 % gel      2. Finger joint swelling, right  Referral to Orthopedics      3. History of migraine               Comments/MDM:     I personally reviewed prior external notes and prior test results pertinent to today's visit.  Patient having persistent BV like symptoms reviewed results from previous encounter in June positive for Gardnerella she did request to be treated with gel on today's exam we will treat empirically follow-up with results and change as indicated.  Patient with no injury to her finger x-ray imaging not indicated as she has no pain we will place referral to orthopedics.  Did encourage patient to establish care with a PCP for further evaluation management on exam she does not endorse a migraine headache pupils are equal and round.  Neurologic exam unremarkable.  Discussed management options, risks and benefits, and alternatives to treatment plan agreed upon.   Red  flags discussed and indications to immediately call 911 or present to the Emergency Department.   Supportive care, differential diagnoses, and indications for immediate follow-up discussed with patient.    Patient expresses understanding and agrees to plan. Patient denies any other questions or concerns.            My total time spent caring for the patient on the day of the encounter was 34 minutes.   This does not include time spent on separately billable procedures/tests.      Please note that this dictation was created using voice recognition software. I have made a reasonable attempt to correct obvious errors, but I expect that there are errors of grammar and possibly content that I did not discover before finalizing the note.    This note was electronically signed by Chance RATLIFF.

## 2023-09-18 LAB
C TRACH DNA GENITAL QL NAA+PROBE: NEGATIVE
N GONORRHOEA DNA GENITAL QL NAA+PROBE: NEGATIVE
SPECIMEN SOURCE: NORMAL

## 2023-10-11 ENCOUNTER — TELEPHONE (OUTPATIENT)
Dept: URGENT CARE | Facility: CLINIC | Age: 43
End: 2023-10-11
Payer: MEDICAID

## 2023-10-11 DIAGNOSIS — B96.89 BV (BACTERIAL VAGINOSIS): ICD-10-CM

## 2023-10-11 DIAGNOSIS — N76.0 BV (BACTERIAL VAGINOSIS): ICD-10-CM

## 2023-10-11 PROBLEM — M67.441 GANGLION CYST OF FINGER OF RIGHT HAND: Status: ACTIVE | Noted: 2023-10-11

## 2023-10-11 PROBLEM — G56.01 CARPAL TUNNEL SYNDROME OF RIGHT WRIST: Status: ACTIVE | Noted: 2023-10-11

## 2023-10-11 RX ORDER — METRONIDAZOLE 7.5 MG/G
1 GEL VAGINAL
Qty: 5 EACH | Refills: 0 | Status: SHIPPED | OUTPATIENT
Start: 2023-10-11 | End: 2023-10-16

## 2024-04-05 ENCOUNTER — APPOINTMENT (OUTPATIENT)
Dept: RADIOLOGY | Facility: MEDICAL CENTER | Age: 44
End: 2024-04-05
Attending: EMERGENCY MEDICINE
Payer: MEDICAID

## 2024-04-05 ENCOUNTER — HOSPITAL ENCOUNTER (EMERGENCY)
Facility: MEDICAL CENTER | Age: 44
End: 2024-04-05
Attending: EMERGENCY MEDICINE
Payer: MEDICAID

## 2024-04-05 VITALS
TEMPERATURE: 98.3 F | SYSTOLIC BLOOD PRESSURE: 147 MMHG | HEIGHT: 61 IN | OXYGEN SATURATION: 98 % | BODY MASS INDEX: 28.39 KG/M2 | RESPIRATION RATE: 16 BRPM | HEART RATE: 82 BPM | DIASTOLIC BLOOD PRESSURE: 85 MMHG | WEIGHT: 150.35 LBS

## 2024-04-05 DIAGNOSIS — M79.601 PAIN OF RIGHT UPPER EXTREMITY: ICD-10-CM

## 2024-04-05 PROCEDURE — 72040 X-RAY EXAM NECK SPINE 2-3 VW: CPT

## 2024-04-05 PROCEDURE — 20552 NJX 1/MLT TRIGGER POINT 1/2: CPT

## 2024-04-05 PROCEDURE — 700102 HCHG RX REV CODE 250 W/ 637 OVERRIDE(OP): Performed by: EMERGENCY MEDICINE

## 2024-04-05 PROCEDURE — 73030 X-RAY EXAM OF SHOULDER: CPT | Mod: RT

## 2024-04-05 PROCEDURE — 700101 HCHG RX REV CODE 250: Performed by: EMERGENCY MEDICINE

## 2024-04-05 PROCEDURE — 99284 EMERGENCY DEPT VISIT MOD MDM: CPT

## 2024-04-05 PROCEDURE — A9270 NON-COVERED ITEM OR SERVICE: HCPCS | Performed by: EMERGENCY MEDICINE

## 2024-04-05 RX ORDER — IBUPROFEN 600 MG/1
600 TABLET ORAL ONCE
Status: COMPLETED | OUTPATIENT
Start: 2024-04-05 | End: 2024-04-05

## 2024-04-05 RX ORDER — LIDOCAINE HYDROCHLORIDE 20 MG/ML
20 INJECTION, SOLUTION INFILTRATION; PERINEURAL ONCE
Status: COMPLETED | OUTPATIENT
Start: 2024-04-05 | End: 2024-04-05

## 2024-04-05 RX ORDER — CARISOPRODOL 350 MG/1
350 TABLET ORAL EVERY 8 HOURS PRN
Qty: 15 TABLET | Refills: 0 | Status: SHIPPED | OUTPATIENT
Start: 2024-04-05 | End: 2024-04-08

## 2024-04-05 RX ORDER — OXYCODONE HYDROCHLORIDE AND ACETAMINOPHEN 5; 325 MG/1; MG/1
1 TABLET ORAL ONCE
Status: COMPLETED | OUTPATIENT
Start: 2024-04-05 | End: 2024-04-05

## 2024-04-05 RX ORDER — OXYCODONE HYDROCHLORIDE AND ACETAMINOPHEN 5; 325 MG/1; MG/1
2 TABLET ORAL ONCE
Status: DISCONTINUED | OUTPATIENT
Start: 2024-04-05 | End: 2024-04-05

## 2024-04-05 RX ADMIN — LIDOCAINE HYDROCHLORIDE 20 ML: 20 INJECTION, SOLUTION INFILTRATION; PERINEURAL at 18:06

## 2024-04-05 RX ADMIN — IBUPROFEN 600 MG: 600 TABLET, FILM COATED ORAL at 18:31

## 2024-04-05 RX ADMIN — OXYCODONE AND ACETAMINOPHEN 1 TABLET: 325; 5 TABLET ORAL at 18:31

## 2024-04-06 NOTE — ED PROVIDER NOTES
CHIEF COMPLAINT  Chief Complaint   Patient presents with    Arm Pain     RUE       LIMITATION TO HISTORY   None    HPI    Olindaia Rose Salvatelli Moniz is a 43 y.o. female right handed  With right upper extremity pain.  Duration about 8 days  Was getting better and got worse over last 24 hours  She describes pain located over her triceps biceps forearm and back.  Better when she keeps her arm over her head  Not associated numbness or weakness  Not associated discoloration of her fingers do not turn red or white or blue  No rashes associated with it.  No history of trauma.  No history of neck issues that she had a history of possible's slipped disc in her neck    Please note that I looked at her chart and she has had some lateral wrist issues.  Appears that she has ganglion cyst and carpal tunnel.  However, she states that it has been chronic for the last 6 months this is exercising different.    OUTSIDE HISTORIAN(S):  None    EXTERNAL RECORDS REVIEWED  In March 28 for office visit  Visit Diagnoses      History of bilateral salpingectomy Z90.79  Arm paresthesia, right R20.2  Adult - Body mass index bmi 27.0-27.9 Z68.27  Acute vaginitis N76.0  Overweight (BMI 25.0-29.9) E66.3  Muscle tension pain M79.10      Orthopedic clinic on the 2023.  Visit Diagnoses      Right hand pain    Carpal tunnel syndrome of right wrist    Ganglion cyst of finger of right hand    REVIEW OF SYSTEMS  None no fever no chills    PAST MEDICAL HISTORY  Past Medical History:   Diagnosis Date    Bipolar affective disorder (HCC) Dx 2008    no meds    Bipolar disorder (HCC) 2008    no meds    Blood transfusion     1999 eptopic preg    Hepatitis C     Hepatitis C 3/22/2010    Hepatitis C     Infectious disease     hep c    Substance abuse (HCC)     Pt. states last used Meth 3 years ago.        FAMILY HISTORY  Family History   Problem Relation Age of Onset    Psychiatric Illness Mother         Bipolar    Cancer Father         Liver & Lung    Lung  "Disease Father         Lung CA, COPD    Cancer Maternal Grandmother         lung    Psychiatric Illness Maternal Grandfather         Schizophrenia       SOCIAL HISTORY  Social History     Tobacco Use    Smoking status: Every Day     Current packs/day: 1.00     Average packs/day: 1 pack/day for 19.0 years (19.0 ttl pk-yrs)     Types: Cigarettes    Smokeless tobacco: Never   Vaping Use    Vaping Use: Never used   Substance Use Topics    Alcohol use: Yes     Comment: rare    Drug use: Not Currently     Types: Methamphetamines     Comment: meth 13 years, Quit 3 yrs ago     Social History     Substance and Sexual Activity   Drug Use Not Currently    Types: Methamphetamines    Comment: meth 13 years, Quit 3 yrs ago       SURGICAL HISTORY  Past Surgical History:   Procedure Laterality Date    PELVISCOPY  1/28/2014    Performed by Katrin Castro M.D. at SURGERY [facility]    ABDOMINAL EXPLORATION      eptopic oreg 1999    OTHER ABDOMINAL SURGERY      etopic preg 1999       CURRENT MEDICATIONS  No current facility-administered medications for this encounter.    Current Outpatient Medications:     amphetamine-dextroamphetamine XR (ADDERALL XR) 10 MG CAPSULE SR 24 HR, Take 10 mg by mouth., Disp: , Rfl:     ALLERGIES  No Known Allergies    PHYSICAL EXAM  VITAL SIGNS: BP (!) 191/109   Pulse 90   Temp 37.1 °C (98.7 °F) (Temporal)   Resp 18   Ht 1.549 m (5' 1\")   Wt 68.2 kg (150 lb 5.7 oz)   SpO2 100%   BMI 28.41 kg/m²   Reviewed and noted  Constitutional: Well developed, Well nourished, no acute distress.  HENT: Normocephalic, atraumatic,  Cardiovascular: Pulses noted on the right upper extremity.  She is good capillary refill less than 2 seconds in the hand  Respiratory: No respiratory distress no stridor  Skin: Eczema.  No vesicles noted.  On the arm.  Or the back.  Musculoskeletal: Significant tenderness over the rhomboid muscles with spasm.  When I press on these it patient feels shooting pain down her arm.  I " am able to range her shoulder is flexed and AB ducted.  Supinate pronate flex extend her wrist and flex extend her elbow without difficulty or pain.  Patient at this point is no tenderness over her wrist.  Patient has a negative Tinel's sign.  Neurologic: Median, ulnar, radial nerve intact light touch motor function  Psychiatric: Affect normal, Judgment normal, Mood normal.         MEDICAL DECISION MAKING:  PROBLEMS EVALUATED THIS VISIT:  Arm pain.  Duration 8 days.  Intermittent got worse today.  Better when she keeps it overhead.  No neurovascular history.  On exam patient does not have a fever not tachycardic neurovascular intact.  And no rash noted she appears have significant tenderness over the rhomboid muscles which may be secondary or the primary cause of this.         PLAN:  Injections  Consider further testing if no response    RISK:  Moderate risk will require prescription medication.      RESULTS          RADIOLOGY        Radiologist interpretation:   DX-SHOULDER 2+ RIGHT   Final Result         1. No acute osseous abnormality.      DX-CERVICAL SPINE-2 OR 3 VIEWS   Final Result         No acute fracture or malalignment.            ED COURSE:    ED Observation Status? No   No noted need for observation for developing issue    INTERVENTIONS BY ME:  Medications   lidocaine (Xylocaine) 2 % injection 20 mL (has no administration in time range)   oxyCODONE-acetaminophen (Percocet) 5-325 MG per tablet 2 Tablet (has no administration in time range)   ibuprofen (Motrin) tablet 600 mg (has no administration in time range)     Point injection  Discussed with patient need for trigger point injections parents and the risk of infection pneumothorax among others.  Patient agreed to the procedure.  She was prepped with chlorhexidine.  We found 2 places 1 in the rhomboid muscles one of the trapezius.  We each injected with 27-gauge needle about 2 cc of lidocaine.  Small microperforations were also done approximately 5-7  in each area.  Patient tolerated procedure well without complications    Response on recheck:  Soon as I did the trigger point patient continued having discomfort and pain.  At this point we will go and give her some Percocet do an x-ray of the cervical spine as well as shoulder..    CONSULTANTS/OTHER GROUPS CONTACTED    Outpatient orthopedic referral made    FINAL DISPO PLAN   New Prescriptions    CARISOPRODOL (SOMA) 350 MG TAB    Take 1 Tablet by mouth every 8 hours as needed for Muscle Spasms for up to 3 days.     Informed the patient that they will get narcotics.  Narcotic prescription drug monitoring check was done.  Patient deemed low risk at this time.  Instructions were given to the patient did not drink or drive while taking narcotic pain medication.  Patient was given under 3-day supply of medications.  Told that long-term medication pain management best suited by primary care doctor and/or pain management doctor.    Patient was given Percocet and Motrin.  X-rays of the shoulder and neck revealed some arthritis in the shoulder.  Patient at this point.  Somewhat improved we had a long discussion regarding uncertain etiology so to return if increasing pain numbness tingling discoloration of her fingers is were still noted etiology.  However, patient is significant pain and muscle spasms I think at this point orthopedic referral back to her doctor decided this is something musculoskeletal or perhaps spine and they can do more testing is needed.  Sling was given for comfort    Followup:  Orthopedics  CONDITION: Removed.     FINAL IMPRESSION  1. Pain of right upper extremity    .  Status post trigger point injections

## 2024-04-06 NOTE — ED NOTES
Discharge home with instructions, rxn, and follow up care reviewed and given to patient with verbal understanding.    Family friend with patient to drive her home.

## 2024-04-06 NOTE — ED TRIAGE NOTES
Patient presents with complaints of worsening RUE pain. States that discomfort has been ongoing and intermittent for the past 2 weeks. Denies any recent falls/trauma. No previous injury. States she has noticed she has less ROM. Took medication earlier this AM but nothing since. Was seen at [facility] and told she had a pinched nerve; no imaging done at that time.

## 2024-04-06 NOTE — DISCHARGE INSTRUCTIONS
At this point were not sure what is causing the pain  Please take the muscle relaxant see if that improves  You can also take ibuprofen.  We recommend he follow-up with the orthopedic doctors next week.  We have made a referral.  If your pain gets worse fingers change colors you get a fever or condition worsens please come back for repeat evaluation

## 2024-10-19 ENCOUNTER — HOSPITAL ENCOUNTER (OUTPATIENT)
Facility: MEDICAL CENTER | Age: 44
End: 2024-10-19
Attending: NURSE PRACTITIONER
Payer: MEDICAID

## 2024-10-19 ENCOUNTER — OFFICE VISIT (OUTPATIENT)
Dept: URGENT CARE | Facility: PHYSICIAN GROUP | Age: 44
End: 2024-10-19
Payer: MEDICAID

## 2024-10-19 VITALS
SYSTOLIC BLOOD PRESSURE: 114 MMHG | RESPIRATION RATE: 14 BRPM | TEMPERATURE: 97.7 F | BODY MASS INDEX: 31.45 KG/M2 | WEIGHT: 166.6 LBS | HEIGHT: 61 IN | DIASTOLIC BLOOD PRESSURE: 76 MMHG | OXYGEN SATURATION: 97 % | HEART RATE: 85 BPM

## 2024-10-19 DIAGNOSIS — Z20.2 POSSIBLE EXPOSURE TO STD: ICD-10-CM

## 2024-10-19 DIAGNOSIS — R63.5 WEIGHT GAIN: ICD-10-CM

## 2024-10-19 DIAGNOSIS — N89.8 VAGINAL IRRITATION: ICD-10-CM

## 2024-10-19 PROCEDURE — 87480 CANDIDA DNA DIR PROBE: CPT

## 2024-10-19 PROCEDURE — 87591 N.GONORRHOEAE DNA AMP PROB: CPT

## 2024-10-19 PROCEDURE — 87660 TRICHOMONAS VAGIN DIR PROBE: CPT

## 2024-10-19 PROCEDURE — 3074F SYST BP LT 130 MM HG: CPT | Performed by: NURSE PRACTITIONER

## 2024-10-19 PROCEDURE — 87491 CHLMYD TRACH DNA AMP PROBE: CPT

## 2024-10-19 PROCEDURE — 87510 GARDNER VAG DNA DIR PROBE: CPT

## 2024-10-19 PROCEDURE — 3078F DIAST BP <80 MM HG: CPT | Performed by: NURSE PRACTITIONER

## 2024-10-19 PROCEDURE — 99214 OFFICE O/P EST MOD 30 MIN: CPT | Performed by: NURSE PRACTITIONER

## 2024-10-21 DIAGNOSIS — N89.8 VAGINAL IRRITATION: ICD-10-CM

## 2024-10-22 ENCOUNTER — HOSPITAL ENCOUNTER (OUTPATIENT)
Dept: LAB | Facility: MEDICAL CENTER | Age: 44
End: 2024-10-22
Attending: NURSE PRACTITIONER
Payer: MEDICAID

## 2024-10-22 ENCOUNTER — TELEPHONE (OUTPATIENT)
Dept: URGENT CARE | Facility: PHYSICIAN GROUP | Age: 44
End: 2024-10-22

## 2024-10-22 DIAGNOSIS — Z20.2 POSSIBLE EXPOSURE TO STD: ICD-10-CM

## 2024-10-22 DIAGNOSIS — N76.0 BV (BACTERIAL VAGINOSIS): ICD-10-CM

## 2024-10-22 DIAGNOSIS — B96.89 BV (BACTERIAL VAGINOSIS): ICD-10-CM

## 2024-10-22 DIAGNOSIS — R63.5 WEIGHT GAIN: ICD-10-CM

## 2024-10-22 LAB
C TRACH DNA GENITAL QL NAA+PROBE: NEGATIVE
HIV 1+2 AB+HIV1 P24 AG SERPL QL IA: NORMAL
N GONORRHOEA DNA GENITAL QL NAA+PROBE: NEGATIVE
SPECIMEN SOURCE: NORMAL
T PALLIDUM AB SER QL IA: NORMAL
TSH SERPL DL<=0.005 MIU/L-ACNC: 0.64 UIU/ML (ref 0.38–5.33)

## 2024-10-22 PROCEDURE — 86780 TREPONEMA PALLIDUM: CPT

## 2024-10-22 PROCEDURE — 36415 COLL VENOUS BLD VENIPUNCTURE: CPT

## 2024-10-22 PROCEDURE — 87389 HIV-1 AG W/HIV-1&-2 AB AG IA: CPT

## 2024-10-22 PROCEDURE — 84443 ASSAY THYROID STIM HORMONE: CPT

## 2024-10-22 RX ORDER — METRONIDAZOLE 500 MG/1
500 TABLET ORAL 2 TIMES DAILY
Qty: 14 TABLET | Refills: 0 | Status: SHIPPED | OUTPATIENT
Start: 2024-10-22 | End: 2024-10-29

## 2025-02-26 ENCOUNTER — HOSPITAL ENCOUNTER (OUTPATIENT)
Facility: MEDICAL CENTER | Age: 45
End: 2025-02-26
Payer: MEDICAID

## 2025-02-26 ENCOUNTER — OFFICE VISIT (OUTPATIENT)
Dept: MEDICAL GROUP | Facility: MEDICAL CENTER | Age: 45
End: 2025-02-26
Payer: MEDICAID

## 2025-02-26 VITALS
SYSTOLIC BLOOD PRESSURE: 116 MMHG | RESPIRATION RATE: 16 BRPM | HEIGHT: 67 IN | DIASTOLIC BLOOD PRESSURE: 68 MMHG | OXYGEN SATURATION: 97 % | HEART RATE: 95 BPM | WEIGHT: 167.3 LBS | TEMPERATURE: 97.1 F | BODY MASS INDEX: 26.26 KG/M2

## 2025-02-26 DIAGNOSIS — Z13.228 SCREENING FOR ENDOCRINE, NUTRITIONAL, METABOLIC AND IMMUNITY DISORDER: ICD-10-CM

## 2025-02-26 DIAGNOSIS — Z13.0 SCREENING FOR ENDOCRINE, NUTRITIONAL, METABOLIC AND IMMUNITY DISORDER: ICD-10-CM

## 2025-02-26 DIAGNOSIS — N89.8 VAGINAL ODOR: ICD-10-CM

## 2025-02-26 DIAGNOSIS — Z12.31 ENCOUNTER FOR SCREENING MAMMOGRAM FOR MALIGNANT NEOPLASM OF BREAST: ICD-10-CM

## 2025-02-26 DIAGNOSIS — R20.9 COLD EXTREMITIES: ICD-10-CM

## 2025-02-26 DIAGNOSIS — R63.5 WEIGHT GAIN: ICD-10-CM

## 2025-02-26 DIAGNOSIS — Z13.29 SCREENING FOR ENDOCRINE, NUTRITIONAL, METABOLIC AND IMMUNITY DISORDER: ICD-10-CM

## 2025-02-26 DIAGNOSIS — Z23 NEED FOR VACCINATION: ICD-10-CM

## 2025-02-26 DIAGNOSIS — Z13.21 SCREENING FOR ENDOCRINE, NUTRITIONAL, METABOLIC AND IMMUNITY DISORDER: ICD-10-CM

## 2025-02-26 DIAGNOSIS — R06.02 SHORTNESS OF BREATH: ICD-10-CM

## 2025-02-26 DIAGNOSIS — R10.13 EPIGASTRIC PAIN: ICD-10-CM

## 2025-02-26 PROCEDURE — 90471 IMMUNIZATION ADMIN: CPT

## 2025-02-26 PROCEDURE — 93005 ELECTROCARDIOGRAM TRACING: CPT | Mod: TC

## 2025-02-26 PROCEDURE — 87510 GARDNER VAG DNA DIR PROBE: CPT

## 2025-02-26 PROCEDURE — 87480 CANDIDA DNA DIR PROBE: CPT

## 2025-02-26 PROCEDURE — 93005 ELECTROCARDIOGRAM TRACING: CPT

## 2025-02-26 PROCEDURE — 99214 OFFICE O/P EST MOD 30 MIN: CPT

## 2025-02-26 PROCEDURE — 87660 TRICHOMONAS VAGIN DIR PROBE: CPT

## 2025-02-26 NOTE — PROGRESS NOTES
Verbal consent was acquired by the patient to use [employer] ambient listening note generation during this visit     Subjective:     CC:  The encounter diagnosis was Shortness of breath.    HISTORY OF THE PRESENT ILLNESS: Patient is a 44 y.o. female.     History of Present Illness  The patient presents for evaluation of weight gain, shortness of breath, chest pain, bacterial vaginosis, Raynaud's disease, autoimmune thyroid disease, and health maintenance.    She reports a sudden weight gain of 30 pounds approximately 6 months ago, despite no significant changes in her diet or physical activity. She has been experiencing abdominal swelling and perceives the weight gain to be more pronounced in her abdominal region. She continues to work as a . She is a smoker, consuming about a pack a day, and occasionally drinks alcohol, about once or twice a week. She is not currently on Adderall due to a missed appointment. She is aware of her vitamin D and B12 deficiencies and is supposed to be receiving B12 injections in her abdomen. 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 She is not currently experiencing any chest pain.    She has been experiencing progressive shortness of breath, which she initially attributed to her weight gain. This symptom has been present for over 6 months and is accompanied by episodes of gasping for air while at rest. She noticed an exacerbation of her shortness of breath when lying down last night. She does not report any significant cough production.    She recalls an episode of chest pain that occurred recently, characterized by a burning sensation and pressure that radiated to her back. The pain lasted for about an hour and was associated with nausea. She also reports frequent lightheadedness, which she suspects may be related to anxiety.    She has been dealing with persistent bacterial vaginosis for the past 2 years,  despite multiple treatments. She reports a fishy odor and some discharge, which is currently not severe. She recently completed a course of medication but does not believe the infection has resolved. She has a history of recurrent bacterial vaginosis throughout her life.    She has been diagnosed with Raynaud's disease and autoimmune thyroid disease, although she has never been treated for the latter. She was diagnosed with autoimmune thyroid disease in 2016 and has never been on anything for thyroid.    She has been experiencing facial hair growth and is seeking treatment for this issue. Her last menstrual period was 3 weeks ago. She had a Pap smear within the last couple of months, which was normal, but she tested positive for HPV the previous year.    She has a long-standing history of swelling in her right leg and ankle, with occasional bilateral swelling. This has been a persistent issue for several years, leading her to discontinue wearing socks.    She experiences pain during meals and has a history of severe reflux. She is not currently taking any medication for this condition.    Supplemental Information  She is not currently using the Mirena IUD. She reports frequent constipation but does not experience any associated pain.    SOCIAL HISTORY  The patient smokes a pack a day but is trying to quit. She drinks alcohol once or twice a week. She is a former methamphetamine addict and has been clean for about 5 years.    FAMILY HISTORY  The patient's mother, father, and grandmother all had lung cancer.    ALLERGIES  The patient has no known drug allergies.    MEDICATIONS  Current: Adderall (noncompliant)    IMMUNIZATIONS  The patient is overdue for a tetanus shot.    Health Maintenance: reviewed and completed per patient preference.     ROS:   PER HPI.           Social History     Socioeconomic History    Marital status:      Spouse name: Not on file    Number of children: Not on file    Years of  "education: Not on file    Highest education level: Not on file   Occupational History    Not on file   Tobacco Use    Smoking status: Every Day     Current packs/day: 1.00     Average packs/day: 1 pack/day for 19.0 years (19.0 ttl pk-yrs)     Types: Cigarettes    Smokeless tobacco: Never   Vaping Use    Vaping status: Never Used   Substance and Sexual Activity    Alcohol use: Yes     Comment: rare    Drug use: Not Currently     Types: Methamphetamines     Comment: meth 13 years, Quit 3 yrs ago    Sexual activity: Yes     Partners: Male     Birth control/protection: None     Comment: None   Other Topics Concern    Not on file   Social History Narrative    ** Merged History Encounter **          Social Drivers of Health     Financial Resource Strain: Not on file   Food Insecurity: Not on file   Transportation Needs: Not on file   Physical Activity: Not on file   Stress: Not on file   Social Connections: Not on file   Intimate Partner Violence: Not on file   Housing Stability: Not on file      No Known Allergies         Current Outpatient Medications:     amphetamine-dextroamphetamine XR, Take 10 mg by mouth.   Objective:     Exam: /68 (BP Location: Left arm, Patient Position: Sitting, BP Cuff Size: Adult)   Pulse 95   Temp 36.2 °C (97.1 °F) (Temporal)   Resp 16   Ht 1.702 m (5' 7\")   Wt 75.9 kg (167 lb 4.8 oz)   SpO2 97%  Body mass index is 26.2 kg/m².    Physical Exam:  Constitutional: Alert, no distress, well-groomed.  Skin: Warm, dry, good turgor, no rashes in visible areas.  Eye: Equal, round and reactive, conjunctiva clear, lids normal.  ENMT: Lips without lesions, good dentition, moist mucous membranes.  Neck: Trachea midline, no masses, no thyromegaly.  Respiratory: Unlabored respiratory effort, no cough.  Abd: soft, non tender, non distended, normal BS  MSK: Normal gait, moves all extremities.  2+ bilateral pitting edema to lower extremities.  Neuro: Grossly non-focal.   Psych: Alert and oriented " x3, normal affect and mood.    EKG Interpretation   Ordered and interpreted by GAUDENCIO Sevilla  Rhythm:normal sinus   Rate: normal   Axis: normal   Ectopy: none   Conduction: normal   ST Segments: no acute change   T Waves: no acute change   Q Waves: none   Clinical Impression: no acute changes and normal EKG      Labs: Reviewed    Assessment & Plan:   44 y.o. female with the following -    1. Shortness of breath  - EKG - Clinic Performed  - DX-CHEST-2 VIEWS; Future  - EC-ECHOCARDIOGRAM COMPLETE W/O CONT; Future    2. Weight gain  - DX-CHEST-2 VIEWS; Future  - EC-ECHOCARDIOGRAM COMPLETE W/O CONT; Future    3. Need for vaccination  - Tdap Vaccine =>8YO IM    4. Encounter for screening mammogram for malignant neoplasm of breast  - MA-SCREENING MAMMO BILAT W/TOMOSYNTHESIS W/CAD; Future    5. Screening for endocrine, nutritional, metabolic and immunity disorder  - CBC WITHOUT DIFFERENTIAL; Future  - MICROALBUMIN CREAT RATIO URINE; Future  - HEMOGLOBIN A1C; Future  - Lipid Profile; Future  - TSH WITH REFLEX TO FT4; Future  - Comp Metabolic Panel; Future  - VITAMIN D,25 HYDROXY (DEFICIENCY); Future  - IRON/TOTAL IRON BIND; Future  - FERRITIN; Future  - VITAMIN B12; Future  - FOLATE; Future    6. Epigastric pain  - H. PYLORI BREATH TEST    7. Vaginal odor  - VAGINAL PATHOGENS DNA PANEL; Future    8. Cold extremities  - RAMY ANTIBODY WITH REFLEX; Future  - CCP ANTIBODIES, IGG/IGA; Future  - CRP QUANTITIVE (NON-CARDIAC); Future  - Sed Rate; Future  - RHEUMATOID FACTOR QUANT; Future  - THYROID PEROXIDASE  (TPO) AB; Future      Assessment & Plan  1. Weight gain.  She has experienced a sudden weight gain of 30 pounds over the past 6 months without changes in diet or activity level. A comprehensive set of fasting labs will be ordered to assess kidney function, liver function, electrolytes, blood cell count, diabetes, prediabetes, vitamin D, and B12 levels.    2. Shortness of breath.  She reports worsening shortness of breath,  particularly when lying down at night. Smoking can cause lung issues, which in turn can lead to heart problems. An EKG will be performed in the office today. A chest x-ray and an echocardiogram will be ordered to rule out heart failure or pulmonary edema. She is advised to quit smoking due to its adverse effects on lung and heart health.  EKG is normal sinus rhythm with no abnormal findings such as ST elevation or depression.    3. Chest pain.  She experienced an episode of chest pain with a burning sensation that radiated to her back, lasting about an hour. An echocardiogram will be ordered to further investigate.  EKG without any ST changes.  Patiently currently denies any active chest pain.     4. Bacterial vaginosis.  She has a persistent bacterial vaginosis infection that has not resolved with previous treatments. A vaginal swab will be collected today to confirm the presence of an active infection. If the swab returns positive, a more extensive treatment plan involving multiple medications will be discussed. If this approach fails, a referral to an OB-GYN specialist will be considered.    5. Raynaud's disease.  She has been diagnosed with Raynaud's disease, which could potentially contribute to the swelling in her legs. Additional labs will be ordered to investigate any underlying autoimmune conditions.    6. Autoimmune thyroid disease.  She was previously told she has autoimmune thyroid disease but has not been treated for it. Her medical records will be requested from her former primary care provider for further evaluation.    7. Health maintenance.  She is overdue for her tetanus vaccine, which will be administered today. A mammogram will be ordered, and she is encouraged to schedule it at her earliest convenience.    8. Reflux.  She has been experiencing pain with eating and severe reflux. An H. pylori breath test will be ordered to check for the presence of H. pylori bacteria, which can cause significant  gastrointestinal discomfort.    Follow-up  The patient will follow up in 1 month to review the results of her labs and imaging studies.          No follow-ups on file.    Please note that this dictation was created using voice recognition software. I have made every reasonable attempt to correct obvious errors, but I expect that there are errors of grammar and possibly content that I did not discover before finalizing the note.

## 2025-02-27 ENCOUNTER — RESULTS FOLLOW-UP (OUTPATIENT)
Dept: MEDICAL GROUP | Facility: MEDICAL CENTER | Age: 45
End: 2025-02-27
Payer: MEDICAID

## 2025-02-27 DIAGNOSIS — N76.0 BV (BACTERIAL VAGINOSIS): ICD-10-CM

## 2025-02-27 DIAGNOSIS — B96.89 BV (BACTERIAL VAGINOSIS): ICD-10-CM

## 2025-02-27 RX ORDER — CLINDAMYCIN PHOSPHATE 20 MG/G
1 CREAM VAGINAL NIGHTLY
Qty: 40 G | Refills: 0 | Status: SHIPPED | OUTPATIENT
Start: 2025-02-27

## 2025-02-27 RX ORDER — TINIDAZOLE 500 MG/1
500 TABLET ORAL 2 TIMES DAILY
Qty: 14 TABLET | Refills: 0 | Status: SHIPPED | OUTPATIENT
Start: 2025-02-27

## 2025-02-27 RX ORDER — FLUCONAZOLE 150 MG/1
150 TABLET ORAL DAILY
Qty: 1 TABLET | Refills: 0 | Status: SHIPPED | OUTPATIENT
Start: 2025-02-27

## 2025-03-11 ENCOUNTER — APPOINTMENT (OUTPATIENT)
Dept: LAB | Facility: MEDICAL CENTER | Age: 45
End: 2025-03-11
Payer: MEDICAID

## 2025-03-12 ENCOUNTER — HOSPITAL ENCOUNTER (OUTPATIENT)
Dept: RADIOLOGY | Facility: MEDICAL CENTER | Age: 45
End: 2025-03-12
Payer: MEDICAID

## 2025-03-12 DIAGNOSIS — Z12.31 ENCOUNTER FOR SCREENING MAMMOGRAM FOR MALIGNANT NEOPLASM OF BREAST: ICD-10-CM

## 2025-03-12 PROCEDURE — 77067 SCR MAMMO BI INCL CAD: CPT

## 2025-03-14 ENCOUNTER — APPOINTMENT (OUTPATIENT)
Dept: RADIOLOGY | Facility: MEDICAL CENTER | Age: 45
End: 2025-03-14
Payer: MEDICAID

## 2025-03-14 ENCOUNTER — ANCILLARY PROCEDURE (OUTPATIENT)
Dept: CARDIOLOGY | Facility: MEDICAL CENTER | Age: 45
End: 2025-03-14
Payer: MEDICAID

## 2025-03-14 DIAGNOSIS — R06.02 SHORTNESS OF BREATH: ICD-10-CM

## 2025-03-14 DIAGNOSIS — R63.5 WEIGHT GAIN: ICD-10-CM

## 2025-03-14 LAB
LV EJECT FRACT  99904: 65
LV EJECT FRACT MOD 4C 99902: 59.58

## 2025-03-14 PROCEDURE — 93306 TTE W/DOPPLER COMPLETE: CPT | Mod: 26 | Performed by: INTERNAL MEDICINE

## 2025-03-14 PROCEDURE — 93306 TTE W/DOPPLER COMPLETE: CPT

## 2025-03-14 PROCEDURE — 71046 X-RAY EXAM CHEST 2 VIEWS: CPT

## 2025-03-18 ENCOUNTER — RESULTS FOLLOW-UP (OUTPATIENT)
Dept: MEDICAL GROUP | Facility: MEDICAL CENTER | Age: 45
End: 2025-03-18
Payer: MEDICAID

## 2025-03-19 ENCOUNTER — RESULTS FOLLOW-UP (OUTPATIENT)
Dept: MEDICAL GROUP | Facility: MEDICAL CENTER | Age: 45
End: 2025-03-19
Payer: MEDICAID

## 2025-03-19 DIAGNOSIS — R92.1 CALCIFICATION OF BREAST: ICD-10-CM

## 2025-03-27 ENCOUNTER — HOSPITAL ENCOUNTER (OUTPATIENT)
Facility: MEDICAL CENTER | Age: 45
End: 2025-03-27
Payer: MEDICAID

## 2025-03-27 DIAGNOSIS — Z13.228 SCREENING FOR ENDOCRINE, NUTRITIONAL, METABOLIC AND IMMUNITY DISORDER: ICD-10-CM

## 2025-03-27 DIAGNOSIS — Z13.0 SCREENING FOR ENDOCRINE, NUTRITIONAL, METABOLIC AND IMMUNITY DISORDER: ICD-10-CM

## 2025-03-27 DIAGNOSIS — Z13.29 SCREENING FOR ENDOCRINE, NUTRITIONAL, METABOLIC AND IMMUNITY DISORDER: ICD-10-CM

## 2025-03-27 DIAGNOSIS — Z13.21 SCREENING FOR ENDOCRINE, NUTRITIONAL, METABOLIC AND IMMUNITY DISORDER: ICD-10-CM

## 2025-03-27 DIAGNOSIS — R20.9 COLD EXTREMITIES: ICD-10-CM

## 2025-03-27 PROCEDURE — 83013 H PYLORI (C-13) BREATH: CPT

## 2025-03-28 LAB — UREA BREATH TEST QL: POSITIVE

## 2025-04-01 ENCOUNTER — RESULTS FOLLOW-UP (OUTPATIENT)
Dept: MEDICAL GROUP | Facility: MEDICAL CENTER | Age: 45
End: 2025-04-01

## 2025-04-01 ENCOUNTER — HOSPITAL ENCOUNTER (OUTPATIENT)
Facility: MEDICAL CENTER | Age: 45
End: 2025-04-01
Payer: MEDICAID

## 2025-04-02 ENCOUNTER — OFFICE VISIT (OUTPATIENT)
Dept: MEDICAL GROUP | Facility: MEDICAL CENTER | Age: 45
End: 2025-04-02
Payer: MEDICAID

## 2025-04-02 VITALS
RESPIRATION RATE: 16 BRPM | SYSTOLIC BLOOD PRESSURE: 130 MMHG | WEIGHT: 169.6 LBS | HEART RATE: 90 BPM | TEMPERATURE: 98.3 F | BODY MASS INDEX: 26.56 KG/M2 | DIASTOLIC BLOOD PRESSURE: 80 MMHG | OXYGEN SATURATION: 98 %

## 2025-04-02 DIAGNOSIS — Z13.29 SCREENING FOR ENDOCRINE, NUTRITIONAL, METABOLIC AND IMMUNITY DISORDER: ICD-10-CM

## 2025-04-02 DIAGNOSIS — Z13.21 SCREENING FOR ENDOCRINE, NUTRITIONAL, METABOLIC AND IMMUNITY DISORDER: ICD-10-CM

## 2025-04-02 DIAGNOSIS — Z13.0 SCREENING FOR ENDOCRINE, NUTRITIONAL, METABOLIC AND IMMUNITY DISORDER: ICD-10-CM

## 2025-04-02 DIAGNOSIS — A04.8 H. PYLORI INFECTION: ICD-10-CM

## 2025-04-02 DIAGNOSIS — Z13.228 SCREENING FOR ENDOCRINE, NUTRITIONAL, METABOLIC AND IMMUNITY DISORDER: ICD-10-CM

## 2025-04-02 DIAGNOSIS — R06.02 SHORTNESS OF BREATH: ICD-10-CM

## 2025-04-02 DIAGNOSIS — N92.1 MENORRHAGIA WITH IRREGULAR CYCLE: ICD-10-CM

## 2025-04-02 PROCEDURE — 3079F DIAST BP 80-89 MM HG: CPT

## 2025-04-02 PROCEDURE — 99213 OFFICE O/P EST LOW 20 MIN: CPT

## 2025-04-02 PROCEDURE — 3075F SYST BP GE 130 - 139MM HG: CPT

## 2025-04-02 PROCEDURE — 99214 OFFICE O/P EST MOD 30 MIN: CPT

## 2025-04-02 RX ORDER — BISMUTH SUBCITRATE POTASSIUM, METRONIDAZOLE, TETRACYCLINE HYDROCHLORIDE 140; 125; 125 MG/1; MG/1; MG/1
3 CAPSULE ORAL
Qty: 120 CAPSULE | Refills: 0 | Status: CANCELLED | OUTPATIENT
Start: 2025-04-02

## 2025-04-02 RX ORDER — OMEPRAZOLE 20 MG/1
20 CAPSULE, DELAYED RELEASE ORAL 2 TIMES DAILY
Qty: 28 CAPSULE | Refills: 0 | Status: SHIPPED | OUTPATIENT
Start: 2025-04-02 | End: 2025-04-16

## 2025-04-02 RX ORDER — CLARITHROMYCIN 500 MG/1
500 TABLET ORAL 2 TIMES DAILY
Qty: 28 TABLET | Refills: 0 | Status: SHIPPED | OUTPATIENT
Start: 2025-04-02 | End: 2025-04-16

## 2025-04-02 RX ORDER — METRONIDAZOLE 500 MG/1
500 TABLET ORAL 3 TIMES DAILY
Qty: 42 TABLET | Refills: 0 | Status: SHIPPED | OUTPATIENT
Start: 2025-04-02 | End: 2025-04-16

## 2025-04-02 NOTE — PROGRESS NOTES
Verbal consent was acquired by the patient to use [employer] ambient listening note generation during this visit     Subjective:     CC:  Diagnoses of H. pylori infection, Menorrhagia with irregular cycle, Screening for endocrine, nutritional, metabolic and immunity disorder, and Shortness of breath were pertinent to this visit.    HISTORY OF THE PRESENT ILLNESS: Patient is a 44 y.o. female.     History of Present Illness  The patient presents for evaluation of heavy menstrual bleeding, H. pylori infection, shortness of breath, and breast calcifications.    She has been experiencing prolonged heavy menstrual bleeding for the past month, which has prevented her from taking prescribed medications for a concurrent diagnosis of bacterial vaginosis (BV). Her last Pap smear, conducted 3 months ago, yielded normal results. She has a history of bilateral salpingectomy due to ectopic pregnancies in 1999 and 2013. In 2017, she was diagnosed with ovarian cysts and autoimmune thyroid disease. She has not experienced hot flashes for approximately a year. Her menstrual cycle was regular until this recent episode. She had a Mirena intrauterine device (IUD) inserted from 2019 to 2024, which was removed a year ago.    She has been diagnosed with Helicobacter pylori infection, which is causing her chronic abdominal pain. She expresses concern about the potential risk of stomach cancer associated with this infection.    She continues to experience shortness of breath, despite normal echocardiogram and chest x-ray results. She reports occasional wheezing. She admits to a history of intravenous drug use and continues to smoke.    She is scheduled for a diagnostic mammogram next week to evaluate breast calcifications.    SOCIAL HISTORY  She admits to a history of i.v. drug use. She still smokes.    FAMILY HISTORY  Her mother went through menopause around the patient's current age. No family history of breast cancer.    Health  Maintenance: reviewed and completed per patient preference.     ROS:   PER HPI.           Social History     Socioeconomic History    Marital status: Single     Spouse name: Not on file    Number of children: Not on file    Years of education: Not on file    Highest education level: Not on file   Occupational History    Not on file   Tobacco Use    Smoking status: Every Day     Current packs/day: 1.00     Average packs/day: 1 pack/day for 19.0 years (19.0 ttl pk-yrs)     Types: Cigarettes    Smokeless tobacco: Never   Vaping Use    Vaping status: Never Used   Substance and Sexual Activity    Alcohol use: Yes     Comment: rare    Drug use: Not Currently     Types: Methamphetamines     Comment: meth 13 years, Quit 3 yrs ago    Sexual activity: Yes     Partners: Male     Birth control/protection: None     Comment: None   Other Topics Concern    Not on file   Social History Narrative    ** Merged History Encounter **          Social Drivers of Health     Financial Resource Strain: Not on file   Food Insecurity: Not on file   Transportation Needs: Not on file   Physical Activity: Not on file   Stress: Not on file   Social Connections: Not on file   Intimate Partner Violence: Not on file   Housing Stability: Not on file      No Known Allergies         Current Outpatient Medications:     metroNIDAZOLE, 500 mg, Oral, TID, Taking    clarithromycin, 500 mg, Oral, BID, Taking    omeprazole, 20 mg, Oral, BID, Taking   Objective:     Exam: /80 (BP Location: Right arm, Patient Position: Sitting, BP Cuff Size: Adult)   Pulse 90   Temp 36.8 °C (98.3 °F) (Temporal)   Resp 16   Wt 76.9 kg (169 lb 9.6 oz)   SpO2 98%  Body mass index is 26.56 kg/m².    Physical Exam:  Constitutional: Alert, no distress, well-groomed.  Skin: Warm, dry, good turgor, no rashes in visible areas.  Eye: Equal, round and reactive, conjunctiva clear, lids normal.  ENMT: Lips without lesions, good dentition, moist mucous membranes.  Neck: Trachea  midline, no masses, no thyromegaly.  Respiratory: Unlabored respiratory effort, no cough.  Abd: soft, non tender, non distended, normal BS  MSK: Normal gait, moves all extremities.  Neuro: Grossly non-focal.   Psych: Alert and oriented x3, normal affect and mood.    Labs: Reviewed    Assessment & Plan:   44 y.o. female with the following -    1. H. pylori infection  - metroNIDAZOLE (FLAGYL) 500 MG Tab; Take 1 Tablet by mouth 3 times a day for 14 days.  Dispense: 42 Tablet; Refill: 0  - clarithromycin (BIAXIN) 500 MG Tab; Take 1 Tablet by mouth 2 times a day for 14 days.  Dispense: 28 Tablet; Refill: 0  - omeprazole (PRILOSEC) 20 MG delayed-release capsule; Take 1 Capsule by mouth 2 times a day for 14 days.  Dispense: 28 Capsule; Refill: 0  - HELICOBACTER PYLORI BREATH TEST; Future    2. Menorrhagia with irregular cycle  - Referral to OB/Gyn    3. Screening for endocrine, nutritional, metabolic and immunity disorder  - Comp Metabolic Panel; Future  - CBC WITHOUT DIFFERENTIAL; Future  - HEMOGLOBIN A1C; Future  - Lipid Profile; Future  - TSH WITH REFLEX TO FT4; Future  - VITAMIN D,25 HYDROXY (DEFICIENCY); Future    4. Shortness of breatH  - PULMONARY FUNCTION TESTS -Test requested: Complete Pulmonary Function Test; Future      Assessment & Plan  1. Menorrhagia.  Given her age, a referral to an OB/GYN specialist has been initiated for further evaluation and potential treatment options, including the possibility of uterine ablation. She has been advised to maintain adequate hydration prior to her lab work.    2. Helicobacter pylori infection.  A treatment regimen consisting of Flagyl (metronidazole) for 2 weeks, clarithromycin for 2 weeks, and omeprazole (Prilosec) for 2 weeks has been prescribed. She has been instructed to discontinue these medications upon completion of the course. A repeat breath test will be conducted 2 weeks post-treatment. She has been encouraged to incorporate probiotics into her diet during this  period. If symptoms persist, a referral to a gastroenterologist will be considered.    3. Shortness of breath.  A pulmonary function test has been ordered to rule out the possibility of obstructive lung disease. She has been advised to schedule a follow-up appointment one week after the test to discuss the results and potential treatment options.    4. Breast calcifications.  She has been reassured that the presence of breast calcifications does not necessarily indicate breast cancer. Additional imaging has been ordered for further evaluation.    Follow-up  The patient is scheduled for a follow-up visit in 6 weeks.    PROCEDURE  The patient had a Mirena IUD inserted from 2019 to 2024, which was removed a year ago. She also underwent bilateral salpingectomy due to ectopic pregnancies in 1999 and 2013.      Return in about 6 weeks (around 5/14/2025).    Please note that this dictation was created using voice recognition software. I have made every reasonable attempt to correct obvious errors, but I expect that there are errors of grammar and possibly content that I did not discover before finalizing the note.

## 2025-04-03 NOTE — Clinical Note
REFERRAL APPROVAL NOTICE         Sent on April 3, 2025                   Olinda Tom J Carloseliseolli Moniz  [address] [facility] [address]                   Dear Ms. Salvatelli Moniz,    After a careful review of the medical information and benefit coverage, [facility] has processed your referral. See below for additional details.    If applicable, you must be actively enrolled with your insurance for coverage of the authorized service. If you have any questions regarding your coverage, please contact your insurance directly.    REFERRAL INFORMATION   Referral #:  [id number]  Referred-To Department    Referred-By Provider:  Pulmonary and Sleep Medicine    KATINA Wei   [facility]      [address] [facility] [address]  [phone] [id number] [address] [facility] [facility] [address]  [phone]    Referral Start Date:  04/02/2025  Referral End Date:   04/02/2026             SCHEDULING  If you do not already have an appointment, please call [phone] to make an appointment.     MORE INFORMATION  If you do not already have a [employer] account, sign up at: [email or web address]  You can access your medical information, make appointments, see lab results, billing information, and more.  If you have questions regarding this referral, please contact  the [facility] Referrals department at:             [phone]. Monday - Friday 8:00AM - 5:00PM.     Sincerely,    [facility]    
complains of pain/discomfort

## 2025-04-07 NOTE — Clinical Note
REFERRAL APPROVAL NOTICE         Sent on April 7, 2025                   Olinda Rosasba Moniz  [address] [facility] [address]                   Dear Ms. Salvatelli Moniz,    After a careful review of the medical information and benefit coverage, [facility] has processed your referral. See below for additional details.    If applicable, you must be actively enrolled with your insurance for coverage of the authorized service. If you have any questions regarding your coverage, please contact your insurance directly.    REFERRAL INFORMATION   Referral #:  [id number]  Referred-To Department    Referred-By Provider:  OB/Gyn    KATINA Wei   [facility]      [address]  [facility] [id number] [phone] [address] [facility] [address]  [facility] [id number] [phone]    Referral Start Date:  04/02/2025  Referral End Date:   04/02/2026             SCHEDULING  If you do not already have an appointment, please call [phone] to make an appointment.     MORE INFORMATION  If you do not already have a [employer] account, sign up at: [email or web address]  You can access your medical information, make appointments, see lab results, billing information, and more.  If you have questions regarding this referral, please contact  the [facility] Referrals department at:             [phone]. Monday - Friday 8:00AM - 5:00PM.     Sincerely,    [facility]

## 2025-04-08 ENCOUNTER — APPOINTMENT (OUTPATIENT)
Dept: RADIOLOGY | Facility: MEDICAL CENTER | Age: 45
End: 2025-04-08
Payer: MEDICAID

## 2025-04-30 ENCOUNTER — HOSPITAL ENCOUNTER (OUTPATIENT)
Facility: MEDICAL CENTER | Age: 45
End: 2025-04-30
Attending: ADVANCED PRACTICE MIDWIFE
Payer: MEDICAID

## 2025-04-30 ENCOUNTER — GYNECOLOGY VISIT (OUTPATIENT)
Dept: OBGYN | Facility: CLINIC | Age: 45
End: 2025-04-30
Payer: MEDICAID

## 2025-04-30 VITALS — BODY MASS INDEX: 26 KG/M2 | WEIGHT: 166 LBS | SYSTOLIC BLOOD PRESSURE: 142 MMHG | DIASTOLIC BLOOD PRESSURE: 76 MMHG

## 2025-04-30 DIAGNOSIS — N76.1 CHRONIC VAGINITIS: ICD-10-CM

## 2025-04-30 DIAGNOSIS — N92.1 MENORRHAGIA WITH IRREGULAR CYCLE: ICD-10-CM

## 2025-04-30 LAB — AMBIGUOUS DTTM AMBI4: NORMAL

## 2025-04-30 PROCEDURE — 99203 OFFICE O/P NEW LOW 30 MIN: CPT | Performed by: ADVANCED PRACTICE MIDWIFE

## 2025-04-30 PROCEDURE — 87491 CHLMYD TRACH DNA AMP PROBE: CPT

## 2025-04-30 PROCEDURE — 87798 DETECT AGENT NOS DNA AMP: CPT | Mod: 91

## 2025-04-30 PROCEDURE — 87480 CANDIDA DNA DIR PROBE: CPT

## 2025-04-30 PROCEDURE — 87660 TRICHOMONAS VAGIN DIR PROBE: CPT

## 2025-04-30 PROCEDURE — 87563 M. GENITALIUM AMP PROBE: CPT

## 2025-04-30 PROCEDURE — 87591 N.GONORRHOEAE DNA AMP PROB: CPT

## 2025-04-30 PROCEDURE — 87510 GARDNER VAG DNA DIR PROBE: CPT

## 2025-04-30 RX ORDER — AMOXICILLIN 125 MG/5ML
50 SUSPENSION, RECONSTITUTED, ORAL (ML) ORAL 3 TIMES DAILY
COMMUNITY

## 2025-04-30 RX ORDER — ONDANSETRON 4 MG/1
4 TABLET, ORALLY DISINTEGRATING ORAL EVERY 6 HOURS PRN
COMMUNITY

## 2025-04-30 ASSESSMENT — FIBROSIS 4 INDEX: FIB4 SCORE: 0.36

## 2025-04-30 NOTE — PROGRESS NOTES
Talia Rose Salvatelli-Moniz is a 44 y.o. female who presents for vaginal bleeding        HPI Comments: Pt presents for vaginal bleeding.  Patient's last menstrual period was 03/01/2025 (approximate).. Patient reports that she has Mirena IUD placed in 2017. She had no bleeding for 5 years. She then had it removed in 2022. For approximately 1 year, she noticed monthly periods. They were a little heavier than previous and lasting 5-6 days instead of her previous 3-4. No family history of bleeding disorders to her knowledge. She does report that a different primary years ago mentioned that she might have some issue with her thyroid. However, she did not have follow up at that time. She is currently being treated for H Pylori and is prescribed many medications.   She was diagnosed with BV in 2/2025 and treated. However, patient reports she has had some difficulty with medication compliance. Did use metrogel last evening.     She does report history of abnormal pap with HPV present but denies ever having colposcopy. She had salpingectomy in 1999 and in 2013 for ectopic pregnancies.     Review of Systems   Pertinent positives documented in HPI and all other systems reviewed & are negative      Past Medical History:   Diagnosis Date    Anemia     Anxiety     Bipolar affective disorder (HCC) Dx 2008    no meds    Bipolar disorder (HCC) 01/01/2008    no meds    Blood transfusion     1999 eptopic preg    Hepatitis C     Hepatitis C 03/22/2010    Hepatitis C     Hypertension     Infectious disease     hep c    Substance abuse (HCC)     Pt. states last used Meth 3 years ago.        Medications:   Current Outpatient Medications Ordered in Epic   Medication Sig Dispense Refill    amoxicillin (AMOXIL) 125 mg/5 mL Recon Susp Take 50 mg/kg/day by mouth 3 times a day.      ondansetron (ZOFRAN ODT) 4 MG TABLET DISPERSIBLE Take 4 mg by mouth every 6 hours as needed for Nausea/Vomiting.       No current Epic-ordered facility-administered  medications on file.          Objective:   Vital measurements:  BP (!) 142/76   Wt 166 lb   Body mass index is 26 kg/m². (Goal BM I>18 <25)    Physical Exam   Nursing note and vitals reviewed.  Constitutional: She is oriented to person, place, and time. She appears well-developed and well-nourished. No distress.     Abdominal: Soft. Bowel sounds are normal. She exhibits no distension and no mass. No tenderness. She has no rebound and no guarding.     Genitourinary:  Pelvic exam was performed with patient supine.  External genitalia with no abnormal pigmentation, labial fusion,rash, tenderness, lesion or injury to the labia bilaterally.  Vagina is moist with no lesions, foul discharge, erythema, tenderness or bleeding. No foreign body around the vagina or signs of injury.   Cervix exhibits no motion tenderness, no discharge and no friability.   Uterus is *** not deviated, not enlarged, not fixed and not tender.  Right adnexum displays no mass, no tenderness and no fullness. Left adnexum displays no mass, no tenderness and no fullness.     Musculoskeletal: Normal range of motion. She exhibits no edema and no tenderness.     Lymphadenopathy: She has no cervical adenopathy.     Skin: Skin is warm and dry. No rash noted. She is not diaphoretic. No erythema. No pallor.     Psychiatric: She has a normal mood and affect. Her behavior is normal. Judgment and thought content normal.               Assessment:     1. Menorrhagia with irregular cycle  US-PELVIC COMPLETE (TRANSABDOMINAL/TRANSVAGINAL) (COMBO)    Chlamydia/GC, PCR (Genital/Anal swab)    VAGINAL PATHOGENS DNA PANEL      2. Chronic vaginitis  MISCELLANEOUS LAB TEST ([facility]/Other)          Plan:   Pelvic US is ordered for patient today.   2.  3.         Follow up in 4-6 weeks.

## 2025-04-30 NOTE — PROGRESS NOTES
Pt here for GYN exam.    Pt states she has been experiencing vaginal bleeding for the past two months. Pt denies any pain. Pt states is has been heavy.  Good# [phone]  LMP: 03/01/2025  Pharmacy confirmed

## 2025-05-01 ENCOUNTER — RESULTS FOLLOW-UP (OUTPATIENT)
Dept: OBGYN | Facility: CLINIC | Age: 45
End: 2025-05-01
Payer: MEDICAID

## 2025-05-01 LAB
C TRACH DNA GENITAL QL NAA+PROBE: POSITIVE
CANDIDA DNA VAG QL PROBE+SIG AMP: POSITIVE
G VAGINALIS DNA VAG QL PROBE+SIG AMP: NEGATIVE
N GONORRHOEA DNA GENITAL QL NAA+PROBE: NEGATIVE
SPECIMEN SOURCE: ABNORMAL
T VAGINALIS DNA VAG QL PROBE+SIG AMP: NEGATIVE

## 2025-05-01 RX ORDER — FLUCONAZOLE 150 MG/1
150 TABLET ORAL ONCE
Qty: 1 TABLET | Refills: 0 | Status: SHIPPED | OUTPATIENT
Start: 2025-05-01 | End: 2025-05-01

## 2025-05-01 NOTE — TELEPHONE ENCOUNTER
----- Message from Nurse Practitioner JAYSON Hawkins sent at 5/1/2025  9:43 AM PDT -----  Noted; will treat with diflucan  JAYSON Hawkins to Talia Rose Salvatelli-Moniz        5/1/25  9:43 AM  Salo Prakash,     The bacterial infection is not showing as present. That could be related to you using the metrogel the night before. We will reswab you at your next visit. You do have a yeast infection and I will send one pill in for this.      5/1/2025 1050  Called pt and informed that she is neg for BV, but could be related to using Metrogel the previous night. Phill will reswab at her next appt.  Informed pt that she does have a yeast infection and RX was sent in to her pharmacy. Verified pharmacy.  Pt agreed and verbalized understanding.

## 2025-05-02 ENCOUNTER — RESULTS FOLLOW-UP (OUTPATIENT)
Dept: OBGYN | Facility: CLINIC | Age: 45
End: 2025-05-02

## 2025-05-02 RX ORDER — AZITHROMYCIN 500 MG/1
TABLET, FILM COATED ORAL
Qty: 2 TABLET | Refills: 0 | Status: SHIPPED | OUTPATIENT
Start: 2025-05-02 | End: 2025-05-07

## 2025-05-04 LAB
M GENITALIUM DNA SPEC QL NAA+PROBE: ABNORMAL
M HOMINIS DNA SPEC QL NAA+PROBE: DETECTED
SPECIMEN SOURCE: ABNORMAL
U PARVUM DNA SPEC QL NAA+PROBE: DETECTED
U UREALYTICUM DNA SPEC QL NAA+PROBE: NOT DETECTED

## 2025-05-07 ENCOUNTER — HOSPITAL ENCOUNTER (OUTPATIENT)
Dept: LAB | Facility: MEDICAL CENTER | Age: 45
End: 2025-05-07
Payer: MEDICAID

## 2025-05-07 ENCOUNTER — RESULTS FOLLOW-UP (OUTPATIENT)
Dept: OBGYN | Facility: CLINIC | Age: 45
End: 2025-05-07
Payer: MEDICAID

## 2025-05-07 DIAGNOSIS — Z13.29 SCREENING FOR ENDOCRINE, NUTRITIONAL, METABOLIC AND IMMUNITY DISORDER: ICD-10-CM

## 2025-05-07 DIAGNOSIS — Z13.21 SCREENING FOR ENDOCRINE, NUTRITIONAL, METABOLIC AND IMMUNITY DISORDER: ICD-10-CM

## 2025-05-07 DIAGNOSIS — Z13.0 SCREENING FOR ENDOCRINE, NUTRITIONAL, METABOLIC AND IMMUNITY DISORDER: ICD-10-CM

## 2025-05-07 DIAGNOSIS — Z13.228 SCREENING FOR ENDOCRINE, NUTRITIONAL, METABOLIC AND IMMUNITY DISORDER: ICD-10-CM

## 2025-05-07 LAB
25(OH)D3 SERPL-MCNC: 32 NG/ML (ref 30–100)
ALBUMIN SERPL BCP-MCNC: 3.9 G/DL (ref 3.2–4.9)
ALBUMIN/GLOB SERPL: 1.3 G/DL
ALP SERPL-CCNC: 80 U/L (ref 30–99)
ALT SERPL-CCNC: 18 U/L (ref 2–50)
ANION GAP SERPL CALC-SCNC: 11 MMOL/L (ref 7–16)
AST SERPL-CCNC: 19 U/L (ref 12–45)
BILIRUB SERPL-MCNC: <0.2 MG/DL (ref 0.1–1.5)
BUN SERPL-MCNC: 11 MG/DL (ref 8–22)
CALCIUM ALBUM COR SERPL-MCNC: 8.9 MG/DL (ref 8.5–10.5)
CALCIUM SERPL-MCNC: 8.8 MG/DL (ref 8.5–10.5)
CHLORIDE SERPL-SCNC: 102 MMOL/L (ref 96–112)
CHOLEST SERPL-MCNC: 195 MG/DL (ref 100–199)
CO2 SERPL-SCNC: 21 MMOL/L (ref 20–33)
CREAT SERPL-MCNC: 0.8 MG/DL (ref 0.5–1.4)
ERYTHROCYTE [DISTWIDTH] IN BLOOD BY AUTOMATED COUNT: 47.3 FL (ref 35.9–50)
EST. AVERAGE GLUCOSE BLD GHB EST-MCNC: 100 MG/DL
FASTING STATUS PATIENT QL REPORTED: NORMAL
GFR SERPLBLD CREATININE-BSD FMLA CKD-EPI: 93 ML/MIN/1.73 M 2
GLOBULIN SER CALC-MCNC: 3 G/DL (ref 1.9–3.5)
GLUCOSE SERPL-MCNC: 82 MG/DL (ref 65–99)
HBA1C MFR BLD: 5.1 % (ref 4–5.6)
HCT VFR BLD AUTO: 35.5 % (ref 37–47)
HDLC SERPL-MCNC: 60 MG/DL
HGB BLD-MCNC: 10.7 G/DL (ref 12–16)
LDLC SERPL CALC-MCNC: 106 MG/DL
MCH RBC QN AUTO: 27.5 PG (ref 27–33)
MCHC RBC AUTO-ENTMCNC: 30.1 G/DL (ref 32.2–35.5)
MCV RBC AUTO: 91.3 FL (ref 81.4–97.8)
PLATELET # BLD AUTO: 517 K/UL (ref 164–446)
PMV BLD AUTO: 10.3 FL (ref 9–12.9)
POTASSIUM SERPL-SCNC: 4.2 MMOL/L (ref 3.6–5.5)
PROT SERPL-MCNC: 6.9 G/DL (ref 6–8.2)
RBC # BLD AUTO: 3.89 M/UL (ref 4.2–5.4)
SODIUM SERPL-SCNC: 134 MMOL/L (ref 135–145)
TRIGL SERPL-MCNC: 145 MG/DL (ref 0–149)
TSH SERPL DL<=0.005 MIU/L-ACNC: 0.8 UIU/ML (ref 0.38–5.33)
WBC # BLD AUTO: 6.5 K/UL (ref 4.8–10.8)

## 2025-05-07 PROCEDURE — 80061 LIPID PANEL: CPT

## 2025-05-07 PROCEDURE — 82306 VITAMIN D 25 HYDROXY: CPT

## 2025-05-07 PROCEDURE — 80053 COMPREHEN METABOLIC PANEL: CPT

## 2025-05-07 PROCEDURE — 84443 ASSAY THYROID STIM HORMONE: CPT

## 2025-05-07 PROCEDURE — 85027 COMPLETE CBC AUTOMATED: CPT

## 2025-05-07 PROCEDURE — 36415 COLL VENOUS BLD VENIPUNCTURE: CPT

## 2025-05-07 PROCEDURE — 83036 HEMOGLOBIN GLYCOSYLATED A1C: CPT

## 2025-05-07 RX ORDER — DOXYCYCLINE 100 MG/1
100 CAPSULE ORAL 2 TIMES DAILY
Qty: 14 EACH | Refills: 0 | Status: SHIPPED | OUTPATIENT
Start: 2025-05-07 | End: 2025-05-14

## 2025-05-07 NOTE — TELEPHONE ENCOUNTER
----- Message from Nurse Practitioner JAYSON Hawkins sent at 5/7/2025  8:39 AM PDT -----  Noted; needs treatment with doxycycline. Partner also needs to be treated and she abstain from intercourse for 2 weeks.     NOTE: +Ureaplasma parvum, Mycoplasma hominis    5/7/2025 1022  Left message for pt to call back regarding lab results.

## 2025-05-08 ENCOUNTER — HOSPITAL ENCOUNTER (OUTPATIENT)
Dept: RADIOLOGY | Facility: MEDICAL CENTER | Age: 45
End: 2025-05-08
Payer: MEDICAID

## 2025-05-08 ENCOUNTER — RESULTS FOLLOW-UP (OUTPATIENT)
Dept: MEDICAL GROUP | Facility: MEDICAL CENTER | Age: 45
End: 2025-05-08

## 2025-05-08 DIAGNOSIS — R92.8 ABNORMAL MAMMOGRAM: ICD-10-CM

## 2025-05-08 PROCEDURE — 77065 DX MAMMO INCL CAD UNI: CPT | Mod: LT

## 2025-05-27 ENCOUNTER — HOSPITAL ENCOUNTER (OUTPATIENT)
Dept: RADIOLOGY | Facility: MEDICAL CENTER | Age: 45
End: 2025-05-27
Payer: MEDICAID

## 2025-05-27 DIAGNOSIS — R92.8 ABNORMAL FINDING ON BREAST IMAGING: ICD-10-CM

## 2025-05-27 LAB — PATHOLOGY CONSULT NOTE: NORMAL

## 2025-05-27 PROCEDURE — 88305 TISSUE EXAM BY PATHOLOGIST: CPT | Mod: 26 | Performed by: PATHOLOGY

## 2025-05-27 PROCEDURE — 88360 TUMOR IMMUNOHISTOCHEM/MANUAL: CPT | Mod: 26 | Performed by: PATHOLOGY

## 2025-05-27 PROCEDURE — 88360 TUMOR IMMUNOHISTOCHEM/MANUAL: CPT | Mod: 91 | Performed by: PATHOLOGY

## 2025-05-27 PROCEDURE — A4648 IMPLANTABLE TISSUE MARKER: HCPCS

## 2025-05-27 PROCEDURE — 88305 TISSUE EXAM BY PATHOLOGIST: CPT | Performed by: PATHOLOGY

## 2025-05-28 DIAGNOSIS — C50.919 MALIGNANT NEOPLASM OF FEMALE BREAST, UNSPECIFIED ESTROGEN RECEPTOR STATUS, UNSPECIFIED LATERALITY, UNSPECIFIED SITE OF BREAST (HCC): Primary | ICD-10-CM

## 2025-05-28 NOTE — Clinical Note
REFERRAL APPROVAL NOTICE         Sent on May 28, 2025                   Talia Rose Salvatelli Moniz  [address] [facility] [address]                   Dear Ms. Salvatelli Moniz,    After a careful review of the medical information and benefit coverage, [facility] has processed your referral. See below for additional details.    If applicable, you must be actively enrolled with your insurance for coverage of the authorized service. If you have any questions regarding your coverage, please contact your insurance directly.    REFERRAL INFORMATION   Referral #:  [id number]  Referred-To Department    Referred-By Provider:  Surgery    KATINA Wei   [facility]      [address]  [facility] [address]  [phone] [address]  [facility] [address] [phone]    Referral Start Date:  05/28/2025  Referral End Date:   05/28/2026           SCHEDULING  If you do not already have an appointment, please call [phone] to make an appointment.   MORE INFORMATION  As a reminder, [facility] Breast Surgery [facility] ownership has changed, meaning this location is now owned and operated by [facility]. As such, we want to clarify that our patients should expect to receive two separate bills for the services received at [facility] - one representing the [facility] facility fees as the owner of the establishment, and the other to represent the physician's services and subsequent fees. You can speak with your insurance carrier for a pricing estimate by calling the customer service number on the back of your card and ask about charges for a hospital outpatient visit.  If you do not already have a [employer] account, sign up at: [email or web address]  You can access your medical information, make appointments, see lab results, billing information, and more.  If you have questions regarding this referral, please contact  the [facility] Referrals department at:              [phone]. Monday - Friday 7:30AM - 5:00PM.      Sincerely,  [facility]

## 2025-05-29 ENCOUNTER — TELEPHONE (OUTPATIENT)
Dept: RADIOLOGY | Facility: MEDICAL CENTER | Age: 45
End: 2025-05-29
Payer: MEDICAID

## 2025-05-30 ENCOUNTER — HOSPITAL ENCOUNTER (OUTPATIENT)
Dept: RADIOLOGY | Facility: MEDICAL CENTER | Age: 45
End: 2025-05-30
Attending: ADVANCED PRACTICE MIDWIFE
Payer: MEDICAID

## 2025-05-30 DIAGNOSIS — N92.1 MENORRHAGIA WITH IRREGULAR CYCLE: ICD-10-CM

## 2025-05-30 PROCEDURE — 76830 TRANSVAGINAL US NON-OB: CPT

## 2025-06-02 ENCOUNTER — APPOINTMENT (OUTPATIENT)
Age: 45
End: 2025-06-02
Payer: MEDICAID

## 2025-06-02 VITALS — WEIGHT: 167 LBS | HEIGHT: 61 IN | BODY MASS INDEX: 31.53 KG/M2

## 2025-06-02 DIAGNOSIS — D05.12 DUCTAL CARCINOMA IN SITU (DCIS) OF LEFT BREAST: Primary | ICD-10-CM

## 2025-06-02 DIAGNOSIS — R00.2 PALPITATIONS: ICD-10-CM

## 2025-06-02 DIAGNOSIS — R29.90 MULTIPLE NEUROLOGICAL SYMPTOMS: ICD-10-CM

## 2025-06-02 PROCEDURE — 98003 SYNCH AUDIO-VIDEO NEW HI 60: CPT

## 2025-06-02 ASSESSMENT — ENCOUNTER SYMPTOMS
LIGHT-HEADEDNESS: 1
UNEXPECTED WEIGHT CHANGE: 1
CONSTIPATION: 1
EYE PROBLEMS: 1
DIARRHEA: 1
FATIGUE: 1
HOT FLASHES: 1
BACK PAIN: 1
NUMBNESS: 1
NAUSEA: 1
ARTHRALGIAS: 1
VOMITING: 1
CHEST TIGHTNESS: 1
TROUBLE SWALLOWING: 1
SHORTNESS OF BREATH: 1
NERVOUS/ANXIOUS: 1
LEG SWELLING: 1
ADENOPATHY: 1
VOICE CHANGE: 1
DIZZINESS: 1
BRUISES/BLEEDS EASILY: 1
NECK PAIN: 1
PALPITATIONS: 1
SLEEP DISTURBANCE: 1
HEADACHES: 1

## 2025-06-02 ASSESSMENT — FIBROSIS 4 INDEX: FIB4 SCORE: 0.38

## 2025-06-02 NOTE — PROGRESS NOTES
Subjective:   2025  1:56 PM    Imaging facility:  [facility]  Primary care provider:  KATINA Wei      Chief Complaint:   Chief Complaint   Patient presents with    Breast Cancer       History of presenting illness:  This friendly 44 y.o. year old self-identified  female is kindly referred for virtual consultation regarding left breast cancer. She denies any palpable lumps, skin changes, or nipple discharge.      2 total breast biopsies to her left breast and no previous breast surgery.        Virtual Visit: New Patient   This visit was conducted via Teams using secure and encrypted videoconferencing technology.   The patient was in their home in the [facility].    The patient's identity was confirmed and verbal consent was obtained for this virtual visit.      LABS:  Lab Results   Component Value Date/Time    HBA1C 5.1 2025 04:34 PM        Problem List[1]    Past Surgical History[2]    Allergies[3]    Current Outpatient Medications   Medication Sig    amoxicillin (AMOXIL) 125 mg/5 mL Recon Susp Take 50 mg/kg/day by mouth 3 times a day. (Patient not taking: Reported on 2025)    ondansetron (ZOFRAN ODT) 4 MG TABLET DISPERSIBLE Take 4 mg by mouth every 6 hours as needed for Nausea/Vomiting. (Patient not taking: Reported on 2025)       Social History[4]     Social Hx - Family and Occupational[5]    Family History   Problem Relation Age of Onset    Psychiatric Illness Mother         Bipolar    Cancer Father         Liver & Lung    Lung Disease Father         Lung CA, COPD    Cancer Maternal Grandmother         lung    Psychiatric Illness Maternal Grandfather         Schizophrenia       OB History    Para Term  AB Living   8 4 3 0 3 3   SAB IAB Ectopic Molar Multiple Live Births   1 1 1 0 0 3   Obstetric Comments   Age of first delivery: 23   Menarche: 9   LMP: 2025   No HRT         Review of Systems   Constitutional:  Positive for fatigue (has increased  over the last few months, states sleeping more) and unexpected weight change (30-40 lb gain sine 10/2024).   HENT:   Positive for tinnitus, trouble swallowing (several years, no EGD) and voice change (over the last 2 years).    Eyes:  Positive for eye problems (narrowing of vision (has happened twice, went to ER last month)).   Respiratory:  Positive for chest tightness and shortness of breath (at rest and with exertion).    Cardiovascular:  Positive for chest pain, leg swelling (intermittent) and palpitations (at rest and with activity).   Gastrointestinal:  Positive for constipation, diarrhea (recent while on antibiotics), nausea and vomiting (occurs with reflux).   Endocrine: Positive for hot flashes.   Genitourinary:  Positive for dyspareunia and menstrual problem (endometriosis).    Musculoskeletal:  Positive for arthralgias (shoulders, no ROM limitations - arthritis), back pain and neck pain.   Skin:  Positive for itching.   Neurological:  Positive for dizziness, extremity weakness, headaches (increased over the last month), light-headedness and numbness (wrists - carpal tunnel, and neck).   Hematological:  Positive for adenopathy (reports LN to neck fluctuate in size). Bruises/bleeds easily.   Psychiatric/Behavioral:  Positive for sleep disturbance. The patient is nervous/anxious.        IMAGING:  Screening mammogram 3/12/2025 [facility]: Heterogeneously dense. Last prior mammo 2/2018.   LEFT upper outer cluster of microcalcifications.    LEFT diagnostic mammo 5/8/25:   Upper outer 2 groupings of indeterminate calcifications that vary in size and shape, without definite layering. Best visualized on ML view.     Grouping 1: Relatively anterior and superior.     Grouping 2: Relatively posterior and inferior.     Noted on stereo:  Calcs are 4cm apart on ML and span 2.6cm on CC (mag).      Stereotactic biopsy 5/27/25:     A (LEFT superior upper outer quadrant): Seven 9g cores. Tumark Q clip. 1.5cm posterior lateral  "displacement.     HG DCIS with calicifcations and necrosis.     ER %, NH 31-40%     B (LEFT central breast): Seven 9g cores. Tumark X clip. Medial 1cm displacement.     HG DCIS with calcifications and necrosis.     ER %, NH negative (<1%).    Clinical Stage 0 (cTis cN0 M0).      Objective:   Ht 1.549 m (5' 1\")   Wt 75.8 kg (167 lb)   BMI 31.55 kg/m²       Physical Exam  Constitutional:       General: She is not in acute distress.  Neurological:      Mental Status: She is alert.   Psychiatric:         Mood and Affect: Mood normal.         Behavior: Behavior normal.           Diagnosis:     1. Ductal carcinoma in situ (DCIS) of left breast  Referral to Oncology Psychosocial Screening for Distress    REFERRAL TO ONCOLOGY WELLNESS    REFERRAL TO CARDIOLOGY    Referral to Neurology    Referral to Genetics      2. Palpitations  REFERRAL TO CARDIOLOGY      3. Multiple neurological symptoms  Referral to Neurology          Medical Decision Making:  Today's Assessment / Status / Plan:     ASSESSMENT:  LEFT superior upper outer HG DCIS with calcs and necrosis. Clinical Stage 0 (cTis cN0 M0).  Stereotactic biopsy 25, [facility].    Seven 9g cores. Tumark Q clip. 1.5cm posterior lateral displacement.    HG DCIS with calicifcations and necrosis.    ER %, NH 31-40%    LEFT central HG DCIS with calcs and necrosis. Clinical Stage 0 (cTis cN0 M0).  Stereotactic biopsy 25, [facility].   Tumark X clip. Medial 1cm displacement.    HG DCIS with calcifications and necrosis.    ER %, NH negative (<1%).    Last bilateral mammogram 3/12/2025 [facility]: Heterogeneously dense. Last prior mammo 2018    FH:  Father with liver (primary) and lung cancer (55), maternal grandmother with lung cancer (68), mom with lung cancer (71), paternal grandfather with tongue cancer - unknown age.     Menopausal/HRT status:    Age of first delivery: 23   Menarche: 9   LMP: 2025   No HRT       LIFESTYLE:  Currently smokes 1/2 PPD " x32 years. No vaping or marijuana use. Former history of meth use for 13 years, reports cessation in 2020. Previous alcohol use, for last year and a half would drink 2-4 buzzballs daily. Quit drinking 5/15/25. BMI 31.5.     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     Personal history of leukemia at age 5, unknown treatment.     Hepatitis C, completed treatment and last blood work reported as undetectable.     Currently being treated with antibiotics for H. Pylori and BV.     ROS with complaints of chest pain, shortness of breath, palpitations, and lower extremity edema. Has PFT scheduled 6/4/25. Referral to cardiology placed for surgical clearance.     DISCUSSED/PLAN:  I have reviewed breast imaging, pathology reports, and relevant outside records.  We discussed the primary focus of this appointment is to assess additional imaging or biopsy needs, available resources, and medical history review prior to meeting with Dr. Herr to discuss surgical planning.     We discussed the role of Genetics evaluation for certain high risk gene mutations such as BRCA or other relevant variants, and the significance of a positive result both for the patient and their family.  There are also gene mutations associated with non-breast cancers that could impact screening or other decisions.  Per current guidelines, we offered referral to discuss appropriateness for genetic testing and options.  She would like to proceed.     We have discussed ways to optimize health in surgical preparation. Encourage walking 30 minutes daily as able, starting slowly and increasing as you can. Adding resistance bands or light weights can help build muscle. She feels that she has been struggling with excessive fatigue the last several months. 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      Nutritional choices that focus on a variety of plant protein and lean animal protein with a variety of vegetables and limited processed carbohydrates is recommended. Her amount of eating out has increased over the last few months with an increasing amount of premade meals.     We discussed the implications of continued smoking with wound healing. She is interested in a total mastectomy. She ultimately would like to avoid chemotherapy and radiation therapy if possible. She is also interested in reconstruction but wants to do some additional research on this option. We discussed that with continued smoking she may not be an appropriate candidate for immediate reconstruction but delayed reconstruction may be something to discuss further. I have encouraged her to begin decreasing her cigarette use immediately with the goal of cessation to aide in better wound healing.     Total time spent today by me, including personal review and independent interpretation of available breast imaging and pathology report, outside records, face to face time (60 min for this alone), coordination of care, chart documentation, and , was 100 minutes.      REFERRALS:    Medical oncology - order already placed by PCP  Cardiology - reported symptoms requesting surgical clearance  Neurology - patient last saw in 2021, was told she needs a new referral to [facility]  Oncology Wellness  Genetics         [1]   Patient Active Problem List  Diagnosis    Hepatitis C-no treatment, dx 1999, IV drug hx.    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 at [facility]    ABDOMINAL EXPLORATION      eptopic oreg 1999    OTHER ABDOMINAL SURGERY      etopic preg 1999   [3] No Known Allergies  [4]   Social History  Tobacco Use    Smoking status: Every Day     Current packs/day: 1.00     Average packs/day: 1 pack/day for 19.0 years (19.0 ttl pk-yrs)     Types: Cigarettes    Smokeless tobacco: Never   Vaping Use    Vaping status: Never Used   Substance Use Topics    Alcohol use: Yes     Comment: rare    Drug use: Not Currently     Types: Methamphetamines     Comment: meth 13 years, Quit 3 yrs ago   [5]   Family and Occupational History  Socioeconomic History    Marital status: Single     Spouse name: Not on file    Number of children: Not on file    Years of education: Not on file    Highest education level: Not on file   Occupational History    Not on file

## 2025-06-03 ENCOUNTER — PATIENT OUTREACH (OUTPATIENT)
Dept: ONCOLOGY | Facility: MEDICAL CENTER | Age: 45
End: 2025-06-03
Payer: MEDICAID

## 2025-06-03 NOTE — PROGRESS NOTES
Oncology Nurse Navigation Assessment  Intro call to pt.  Pt awaiting cardiac clearance.  Appointment scheduled on 6/13/25.  Consultation with Dr. Herr scheduled on 6/18/25.  Prefers mastectomy w/o chemo and radiation    DX: DCIS of the left breast  HR positive    POC: POC pending     FAMHX: Cancer-related family history includes Cancer in her father and maternal grandmother.             BARRIERS ASSESSMENT:    TRANSPORTATION: friend will bring her in to surgery  EMPLOYMENT:  works full time cleaning homes    FINANCIAL:  anticipates a loss of income  INSURANCE:  [place] Medicaid  SUPPORT SYSTEM:  Friends.  She also has four children ages -11, 13, 14, and 21  PSYCHOLOGICAL: DST by Breast Surgery - denies barriers  COMMUNICATION: no barrier noted    FAMILY CARE:  denies barriers  SELF CARE:  denies barriers         INTERVENTION:  Referred to [facility]    Contacted [facility] Neurology.  Secured new patient appointment on 7/23/24.  Smoking cessation class

## 2025-06-04 ENCOUNTER — APPOINTMENT (OUTPATIENT)
Dept: PULMONOLOGY | Facility: MEDICAL CENTER | Age: 45
End: 2025-06-04
Payer: MEDICAID

## 2025-06-04 NOTE — Clinical Note
REFERRAL APPROVAL NOTICE         Sent on June 4, 2025                   Olinda Tom Salvatelli Moniz  [address]  [facility] [address]                   Dear Ms. Salvatelli Moniz,    After a careful review of the medical information and benefit coverage, [facility] has processed your referral. See below for additional details.    If applicable, you must be actively enrolled with your insurance for coverage of the authorized service. If you have any questions regarding your coverage, please contact your insurance directly.    REFERRAL INFORMATION   Referral #:  [id number]  Referred-To Department    Referred-By Provider:  Oncology    KATINA Null   [facility]      [address]  [address]  [facility] [address]  [phone] [address]  [facility] [id number] [phone]    Referral Start Date:  06/02/2025  Referral End Date:   06/02/2026             SCHEDULING  If you do not already have an appointment, please call [phone] to make an appointment.     MORE INFORMATION  If you do not already have a [employer] account, sign up at: [email or web address]  You can access your medical information, make appointments, see lab results, billing information, and more.  If you have questions regarding this referral, please contact  the [facility] Referrals department at:             [phone]. Monday - Friday 8:00AM - 5:00PM.     Sincerely,    [facility]

## 2025-06-05 NOTE — Clinical Note
REFERRAL APPROVAL NOTICE         Sent on June 5, 2025                   Olinda Tom Salvatelli Moniz  [address] [facility] [address]                   Dear Ms. Salvatelli Moniz,    After a careful review of the medical information and benefit coverage, [facility] has processed your referral. See below for additional details.    If applicable, you must be actively enrolled with your insurance for coverage of the authorized service. If you have any questions regarding your coverage, please contact your insurance directly.    REFERRAL INFORMATION   Referral #:  [id number]  Referred-To Department    Referred-By Provider:  Hematology Oncology    KATINA Wei   Oncology [facility]      [address]  [facility] [address]  [phone] [address]  [address]  [facility] [address] [phone]    Referral Start Date:  05/28/2025  Referral End Date:   05/28/2026           SCHEDULING  If you do not already have an appointment, please call [phone] to make an appointment.   MORE INFORMATION  As a reminder, [facility] ownership has changed, meaning this location is now owned and operated by [facility]. As such, we want to clarify that our patients should expect to receive two separate bills for the services received at [facility] - one representing the [facility] facility fees as the owner of the establishment, and the other to represent the physician's services and subsequent fees. You can speak with your insurance carrier for a pricing estimate by calling the customer service number on the back of your card and ask about charges for a hospital outpatient visit.  If you do not already have a [employer] account, sign up at: [email or web address]  You can access your medical information, make appointments, see lab results, billing information, and more.  If you have questions regarding this referral, please contact  the [facility] Referrals department at:              [phone]. Monday - Friday 7:30AM - 5:00PM.      Sincerely,  [facility]

## 2025-06-10 ENCOUNTER — PATIENT OUTREACH (OUTPATIENT)
Dept: ONCOLOGY | Facility: MEDICAL CENTER | Age: 45
End: 2025-06-10

## 2025-06-10 ENCOUNTER — APPOINTMENT (OUTPATIENT)
Age: 45
End: 2025-06-10
Payer: MEDICAID

## 2025-06-10 DIAGNOSIS — D05.12 DUCTAL CARCINOMA IN SITU (DCIS) OF LEFT BREAST: Primary | ICD-10-CM

## 2025-06-10 NOTE — PROGRESS NOTES
On Yenny 10, 2025, , spoke to pt. via telephone. TAYLOR Aguilar introduced self and explained the role of support services at the [facility] [place]. Pt. reported that she is doing “okay”. DEEPA Jesus previously messaged pt. information about the [employer] and TAYLOR Aguilar inquired about how  pt. was doing with application and pt. reported that she was “overwhelmed” but the application. Pt. works as a  and is missing out on work due to treatment. Pt. also has school aged children. Pt. reported that she is “very stressed” financially.   Pt. has [place] Medicaid so pt. is unable to qualify for [employer], but TAYLOR Aguilar will have pt. apply to the [employer]. TAYLOR Aguilar also informed pt. of other national grants, such as Kaitlynn Jeffers, [employer] and [employer] Fund.    Pt. drives to her appointments, but is concerned with the cost of gas and getting to her appointments. TAYLOR Aguilar will have pt. fill out an application for [employer] gas assistance.   Pt.is seeing Dr. An Ramos for [facility]. Pt. denied any mental health concerns at this time and denied needing mental health resources. TAYLOR Aguilar informed pt. of the [facility] for community support.     TAYLOR Aguilar compiled a folder with the [employer] and [employer] gas applications and informed pt. that the folder will be left for pt. at her appointment with Dr. Ramos on 06/12/2025.

## 2025-06-10 NOTE — PROGRESS NOTES
Primary Care Provider KATINA Wei   Referring Provider: GAUDENCIO Gill   Surgical Oncologist: Lovely Herr MD       HPI:  44 y.o.    Patient referred for wellness / lifestyle medicine care after diagnosis of L breast DCIS ER+ / HI-  5/27/2025    Co-morbidities: bipolar disorder, h/o meth use, tobacco use 1 ppd         Olinda Whitmanmalissai Moniz has a current medication list which includes the following prescription(s): amoxicillin and ondansetron.     Review of Systems  - obtained by surgical oncology on 6/2/2025  Constitutional:  Positive for fatigue (has increased over the last few months, states sleeping more) and unexpected weight change (30-40 lb gain sine 10/2024).   HENT:   Positive for tinnitus, trouble swallowing (several years, no EGD) and voice change (over the last 2 years).    Eyes:  Positive for eye problems (narrowing of vision (has happened twice, went to ER last month)).   Respiratory:  Positive for chest tightness and shortness of breath (at rest and with exertion).    Cardiovascular:  Positive for chest pain, leg swelling (intermittent) and palpitations (at rest and with activity).   Gastrointestinal:  Positive for constipation, diarrhea (recent while on antibiotics), nausea and vomiting (occurs with reflux).   Endocrine: Positive for hot flashes.   Genitourinary:  Positive for dyspareunia and menstrual problem (endometriosis).    Musculoskeletal:  Positive for arthralgias (shoulders, no ROM limitations - arthritis), back pain and neck pain.   Skin:  Positive for itching.   Neurological:  Positive for dizziness, extremity weakness, headaches (increased over the last month), light-headedness and numbness (wrists - carpal tunnel, and neck).   Hematological:  Positive for adenopathy (reports LN to neck fluctuate in size). Bruises/bleeds easily.   Psychiatric/Behavioral:  Positive for sleep disturbance. The patient is nervous/anxious.          Olinda Lombardo  Moniz has a current medication list which includes the following prescription(s): amoxicillin and ondansetron.     Problem List[1]     Past Surgical History[2]     Social History[3]     Family History   Problem Relation Age of Onset    Psychiatric Illness Mother         Bipolar    Cancer Father         Liver & Lung    Lung Disease Father         Lung CA, COPD    Cancer Maternal Grandmother         lung    Psychiatric Illness Maternal Grandfather         Schizophrenia        Talia Rose Salvatelli Moniz has no known allergies.     Ambulatory Vitals        Physical Exam     1. Ductal carcinoma in situ (DCIS) of left breast       We reviewed the goal of the oncology wellness clinic is to assist her in optimizing health, sense of well-being, reducing recurrence risk, and improving quality of life after a cancer diagnosis. In the situation of those without a cancer diagnosis but at high risk, our focus is on lifestyle focused prevention strategies. We identified the following areas which are currently identified as priorities to work on by this patient:     {MRHWellnessConcerns:05181}    Our current plan includes:   {MRHLifestylePlan:63996}    Sexual Health Plan:   {MRexualHealth:26786}    Total time spent today, including personal review of past history, face to face time, chart documentation, and  was ***  minutes.          [1]   Patient Active Problem List  Diagnosis    Hepatitis C-no treatment, dx 1999, IV drug hx.    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 at SURGERY [facility] ORS    ABDOMINAL EXPLORATION      eptopic oreg 1999    OTHER ABDOMINAL SURGERY      etopic preg 1999   [3]   Social History  Tobacco Use    Smoking status: Every Day     Current packs/day: 1.00     Average packs/day: 1 pack/day for 19.0 years (19.0 ttl pk-yrs)     Types: Cigarettes    Smokeless tobacco: Never   Vaping Use    Vaping status: Never Used   Substance Use Topics    Alcohol use: Yes     Comment: rare    Drug use: Not Currently     Types: Methamphetamines     Comment: meth 13 years, Quit 3 yrs ago

## 2025-06-10 NOTE — PROGRESS NOTES
Oncology Nurse Navigation   ACS gas assistance and Breast Fund applications left with CARROLL [place] with [facility] for pt  on 6/12/25.

## 2025-06-11 ENCOUNTER — RESULTS FOLLOW-UP (OUTPATIENT)
Dept: OBGYN | Facility: CLINIC | Age: 45
End: 2025-06-11

## 2025-06-11 NOTE — Clinical Note
REFERRAL APPROVAL NOTICE         Sent on June 11, 2025                   Olinda Rosaslli Moniz  [address]   [place] [address]                   Dear Ms. Salvatelli Moniz,    After a careful review of the medical information and benefit coverage, [facility] has processed your referral. See below for additional details.    If applicable, you must be actively enrolled with your insurance for coverage of the authorized service. If you have any questions regarding your coverage, please contact your insurance directly.    REFERRAL INFORMATION   Referral #:  [id number]  Referred-To Department    Referred-By Provider:  Pulmonology    Greg Jesus R.N.   [facility]      No address on file  Referring provider phone N/A [address]  [place] [address]  [phone]    Referral Start Date:  06/04/2025  Referral End Date:   06/04/2026             SCHEDULING  If you do not already have an appointment, please call [phone] to make an appointment.     MORE INFORMATION  If you do not already have a [employer] account, sign up at: [email or web address]  You can access your medical information, make appointments, see lab results, billing information, and more.  If you have questions regarding this referral, please contact  the [facility] Referrals department at:             [phone]. Monday - Friday 8:00AM - 5:00PM.     Sincerely,    [facility]

## 2025-06-12 ENCOUNTER — PATIENT OUTREACH (OUTPATIENT)
Dept: ONCOLOGY | Facility: MEDICAL CENTER | Age: 45
End: 2025-06-12

## 2025-06-12 ENCOUNTER — OFFICE VISIT (OUTPATIENT)
Age: 45
End: 2025-06-12
Payer: MEDICAID

## 2025-06-12 ENCOUNTER — NON-PROVIDER VISIT (OUTPATIENT)
Dept: GENETICS | Facility: MEDICAL CENTER | Age: 45
End: 2025-06-12
Payer: MEDICAID

## 2025-06-12 VITALS
SYSTOLIC BLOOD PRESSURE: 118 MMHG | DIASTOLIC BLOOD PRESSURE: 78 MMHG | RESPIRATION RATE: 18 BRPM | WEIGHT: 163.3 LBS | HEART RATE: 90 BPM | HEIGHT: 62 IN | OXYGEN SATURATION: 100 % | BODY MASS INDEX: 30.05 KG/M2

## 2025-06-12 DIAGNOSIS — D05.12 DUCTAL CARCINOMA IN SITU (DCIS) OF LEFT BREAST: Primary | ICD-10-CM

## 2025-06-12 DIAGNOSIS — Z72.0 TOBACCO USE: ICD-10-CM

## 2025-06-12 PROCEDURE — 99204 OFFICE O/P NEW MOD 45 MIN: CPT | Performed by: OBSTETRICS & GYNECOLOGY

## 2025-06-12 ASSESSMENT — FIBROSIS 4 INDEX: FIB4 SCORE: 0.38

## 2025-06-12 ASSESSMENT — PAIN SCALES - GENERAL: PAINLEVEL_OUTOF10: NO PAIN

## 2025-06-12 NOTE — PROGRESS NOTES
Instructed patient on how to retrieve a successful saliva sample due to being a difficult stick. Confirmed that the patient has not had anything to eat or drink in the last 30 minutes.     Informed her that I will reach out to her genetic counselor to let them know the order needed to be switched to saliva instead of blood. Successful specimen retrieved. Patient agreed and verbalized understanding.

## 2025-06-12 NOTE — PROGRESS NOTES
Oncology Nurse Navigation  [facility] Patient Assistance application received and processed. [facility] Transportation Assistance application received and submitted to OSMARGRAET Aguilar.  $100 gas card left at  of Breast Surgery for pt to  at her upcoming appointment on 6/18/25.

## 2025-06-12 NOTE — PROGRESS NOTES
"            Primary Care Provider KATINA Wei   Referring Provider: GAUDENCIO Gill   Surgical Oncologist: Lovely Herr MD       HPI:  44 y.o.    Patient referred for wellness / lifestyle medicine care after diagnosis of L breast DCIS ER+ / MI-  5/27/2025  Has appt with Dr Herr next week to discuss surgical treatment    Co-morbidities: bipolar disorder (no meds currently), h/o meth use in past, tobacco use 1 ppd, leukemia - at age 5    This very delightful patient presents today to discuss lifestyle factors going forward as well as rehabilitation strategies for upcoming breast surgery.  She states overall her mental health is quite good states she is \"pretty positive\" she has great support from her Mormon and Mormon friends.  She does have a fiancé although he is currently incarcerated.  They do have a very good relationship, plan to get  and he plans to help her raise her 4 children.    A few medical issues of importance: She is noted to be iron deficient with anemia.  She states she is seeing a gynecologist for evaluation and workup of this.  She states that she had her Mirena removed about a year ago and then had a very long episode of bleeding which lasted approximately 3 months.  Ultimately this stopped and then she has had a regular menses since.  She also complains of weight gain of approximately 35 pounds in the past 6 months.  She really is unsure as to reason why.  She recently had labs done with a normal hemoglobin A1c, normal triglycerides, normal HDL, normal TSH.  She currently is interested in smoking cessation and has been referred to the smoking cessation class.    From a nutrition standpoint she eats very little in the morning typically just has coffee.  She states her vice is Coke but she has been cutting back and just is drinking 1/day or less.  She states her typical lunch will be whole-grain bread with turkey.  She does not snack very often if she does its chips and " salsa but this is infrequent.  She does not eat a lot of other sugar sweetened items.  Dinner typically is a meat, potato, vegetable.  She does recognize that she needs to eat more fruit.    From an exercise standpoint she is working on increasing her walking.  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    What aspects of your wellness do you feel like you need help with to enhance treatment, recovery, and/or improve your overall health? Nutrition    Stress Management    Sleep    Energy Level    Achieving a Healthy Weight   What 3 aspect are you most motivated to change to improve your health, wellness, and overall sense of thriving after a cancer diagnosis? Nutrition    Stress Management    Achieving Healthy Weight   Over the last two weeks, how many hours of sleep did you average per night? More than 9 hours   Does your average time sleeping per night feel adequate? No   Over the last 2 weeks, how often do you feel tired, fatigued, or sleepy to the point of struggling with routine tasks in the day? Most days   Over the last 2 weeks, how often have you eaten fast food, sugary drinks (soda, sports drinks, juice), or packaged foods (chips, candy, crackers, baked goods)? Several days   On a typical day, how many servings of whole fruits and vegetables do you eat (1 serving is about a handful)? 2-3 servings   Over the last 2 weeks, how many days did you exercise at moderate to strenuous intensity (brisk enough to break a sweat)? 5 or more times   During an average session, how many minutes do you exercise to strenuous intensity? 50 minutes or more   Over the past 2 weeks, how often have you felt like your life had purpose or meaning? Most days   Over the past 2 weeks, how often have you been down or depressed? Several days   Over the past 2 weeks, how often have you been connected with any support network? Several days   Over the past 2 weeks, how often  have you been nervous or anxious? Most days   Over the past 2 weeks, how often have you experienced little interest or pleasure in things? Several days   Have you used the following substances (nicotine: including cigarettes, vaping, cigars) in the past year? Yes   How much per day? 20   Have you used the following substances (recreational drugs) in the past year? No   Provide details.    Have you used the following substances (alcohol) in the past year? Yes   How many drinks in a typical week? 6   Are you concerned about your level of use of any of the substances discussed? Yes   Provide details. I actually quit drinking about middle of May this year          Talia Rose Salvatelli Moniz has a current medication list which includes the following prescription(s): amoxicillin and ondansetron.     Review of Systems  - obtained by surgical oncology on 6/2/2025  Constitutional:  Positive for fatigue (has increased over the last few months, states sleeping more) and unexpected weight change (30-40 lb gain sine 10/2024).   HENT:   Positive for tinnitus, trouble swallowing (several years, no EGD) and voice change (over the last 2 years).    Eyes:  Positive for eye problems (narrowing of vision (has happened twice, went to ER last month)).   Respiratory:  Positive for chest tightness and shortness of breath (at rest and with exertion).    Cardiovascular:  Positive for chest pain, leg swelling (intermittent) and palpitations (at rest and with activity).   Gastrointestinal:  Positive for constipation, diarrhea (recent while on antibiotics), nausea and vomiting (occurs with reflux).   Endocrine: Positive for hot flashes.   Genitourinary:  Positive for dyspareunia and menstrual problem (endometriosis).    Musculoskeletal:  Positive for arthralgias (shoulders, no ROM limitations - arthritis), back pain and neck pain.   Skin:  Positive for itching.   Neurological:  Positive for dizziness, extremity weakness, headaches (increased  "over the last month), light-headedness and numbness (wrists - carpal tunnel, and neck).   Hematological:  Positive for adenopathy (reports LN to neck fluctuate in size). Bruises/bleeds easily.   Psychiatric/Behavioral:  Positive for sleep disturbance. The patient is nervous/anxious.          Talia Rose Salvatelli Moniz has a current medication list which includes the following prescription(s): amoxicillin and ondansetron.     Problem List[1]     Past Surgical History[2]     Social History[3]     Family History   Problem Relation Age of Onset    Psychiatric Illness Mother         Bipolar    Cancer Father         Liver & Lung    Lung Disease Father         Lung CA, COPD    Cancer Maternal Grandmother         lung    Psychiatric Illness Maternal Grandfather         Schizophrenia        Talia Rose Salvatelli Moniz has no known allergies.     Ambulatory Vitals  Encounter Vitals  Blood Pressure: 118/78  Pulse: 90  Respiration: 18  Pulse Oximetry: 100 %  Weight: 74.1 kg (163 lb 4.8 oz)  Height: 157.5 cm (5' 2\")  BMI (Calculated): 29.87  Pain Score: No pain          Physical Exam  Vitals reviewed.   Constitutional:       Appearance: Normal appearance.   Skin:     General: Skin is warm.   Neurological:      General: No focal deficit present.      Mental Status: She is alert and oriented to person, place, and time.   Psychiatric:         Mood and Affect: Mood normal.         Behavior: Behavior normal.         Thought Content: Thought content normal.          1. Ductal carcinoma in situ (DCIS) of left breast    2. Tobacco use         We reviewed the goal of the oncology wellness clinic is to assist her in optimizing health, sense of well-being, reducing recurrence risk, and improving quality of life after a cancer diagnosis. In the situation of those without a cancer diagnosis but at high risk, our focus is on lifestyle focused prevention strategies. We identified the following areas which are currently identified as " priorities to work on by this patient:     nutrition to support her cancer care or recovery  and the use of physical activity to benefit her cancer care or recovery    Our current plan includes:   We discussed a wide variety of lifestyle factors and the importance they play in cancer survivorship, not only to optimize short and long term health, but to reduce the risk of recurrence, and improve quality of life in all stages of the disease. , I reviewed the lifestyle data specific to breast cancer recurrence risk and adherence to lifestyle recommendations from: Oanh RA, Fadia KM, Bebeto EW, et al. Adherence to Cancer Prevention Lifestyle Recommendations Before, During, and 2 Years After Treatment for High-risk Breast Cancer. ESTEFANIA Netw Open. 2023;6(5):i6119888. doi:10.1001/jamanetworkopen.2023.31218 and the specifics of these recommendations were reviewed. , Patient was given my packet of lifestyle resources, including nutrition and physical activity information , The benefits of a more plant-forward diet, focused on a wide variety of fruits, vegetables, legumes, nuts, seeds, and lean proteins was explained. , The patient and I discussed a goal of incorporating more fiber as well as diversity, slowly increasing to 25-30 grams a day. There is also benefit to a wide diversity of fiber sources. , The patient will work toward 5-8 servings of fruits/ vegetables per day, which helps also meet fiber goals. , The patient will try to minimize added sugar to less than 25 grams / day. , A hydration goal was set of oz = body weight / 2., Discussed the ultimate goal being 150 - 300 min a week of moderate intensity movement AND 2 strength training workouts / week. But it is important for patients to start where they are and slowly increase to these goals , Local exercise resources were shared with the patient such as  [employer], [employer], and the new walking program at [facility]., Virtual exercise programs were  shared with the patient including the use of YouTube to find free strength, mobility, balance routines which fit their goals. The [facility] has almost 400 of these videos. , and The importance and use of prehabilitation strategies to prepare for upcoming surgery were discussed. Nutrition, physical activity to increase strength and endurance, as well as mindfulness strategies to prepare mentally were reviewed. Detailed written information regarding  strategies was provided,     The patient really does seem motivated to improve her health.  She is going to follow-up with the smoking cessation program.  Will work on upper body strength and mobility as well as increasing walking in the presurgical timeframe  She has been a cut down on soda use.  We did review her recent labs and the fact that overall her metabolic health looks quite good but the importance of making some lifestyle changes to maintain this.  She states that she has been given iron instructions regarding her iron deficiency anemia and is working with gynecology regarding this evaluation.    I will plan to see the patient back in about 6 to 8 weeks but with the plan to time it in coordination with an appointment with Dr. Herr or Dr. Bennett    Total time spent today, including personal review of past history, face to face time, chart documentation, and  was 45  minutes.            [1]   Patient Active Problem List  Diagnosis    Hepatitis C-no treatment, dx 1999, IV drug hx.    Bipolar affective disorder (HCC)    Tobacco dependence-smokes 1/2 ppd     N&V (nausea and vomiting)    Drug use - Meth - clean 3 yrs    Hx of ectopic pregnancy - 1999    H/O transfusion - 1999    Constipation in pregnancy    Encounter for supervision of other normal pregnancy    Labor and delivery, indication for care    Carpal tunnel syndrome of right wrist    Ganglion cyst of finger of right hand    Shortness of breath    Weight gain   [2]   Past  Surgical History:  Procedure Laterality Date    PELVISCOPY  1/28/2014    Performed by Katrin Castro M.D. at SURGERY [facility] ORS    ABDOMINAL EXPLORATION      eptopic oreg 1999    OTHER ABDOMINAL SURGERY      etopic preg 1999   [3]   Social History  Tobacco Use    Smoking status: Every Day     Current packs/day: 1.00     Average packs/day: 1 pack/day for 19.0 years (19.0 ttl pk-yrs)     Types: Cigarettes    Smokeless tobacco: Never   Vaping Use    Vaping status: Never Used   Substance Use Topics    Alcohol use: Yes     Comment: rare    Drug use: Not Currently     Types: Methamphetamines     Comment: meth 13 years, Quit 3 yrs ago

## 2025-06-13 ENCOUNTER — APPOINTMENT (OUTPATIENT)
Dept: CARDIOLOGY | Facility: MEDICAL CENTER | Age: 45
End: 2025-06-13
Payer: MEDICAID

## 2025-06-16 ENCOUNTER — TELEPHONE (OUTPATIENT)
Age: 45
End: 2025-06-16
Payer: MEDICAID

## 2025-06-16 NOTE — TELEPHONE ENCOUNTER
Pt had been scheduled sooner for cardiology apptmt but unable to make it due to being sick and cardiology rescheduled it to August. Iris was able to assist with Soila from scheduling to move the appointment to 7/9 @ 1 PM. Pt informed and pt also informed pt on waitlist and if cardiology is able to see her sooner to take the sooner appointment.

## 2025-06-18 ENCOUNTER — DOCUMENTATION (OUTPATIENT)
Age: 45
End: 2025-06-18

## 2025-06-18 ENCOUNTER — TELEPHONE (OUTPATIENT)
Age: 45
End: 2025-06-18

## 2025-06-18 ENCOUNTER — OFFICE VISIT (OUTPATIENT)
Age: 45
End: 2025-06-18
Payer: MEDICAID

## 2025-06-18 ENCOUNTER — OFFICE VISIT (OUTPATIENT)
Dept: OBGYN | Facility: CLINIC | Age: 45
End: 2025-06-18
Payer: MEDICAID

## 2025-06-18 VITALS
BODY MASS INDEX: 29.94 KG/M2 | DIASTOLIC BLOOD PRESSURE: 85 MMHG | OXYGEN SATURATION: 100 % | WEIGHT: 162.7 LBS | HEIGHT: 62 IN | HEART RATE: 93 BPM | SYSTOLIC BLOOD PRESSURE: 132 MMHG | TEMPERATURE: 98.6 F

## 2025-06-18 VITALS — SYSTOLIC BLOOD PRESSURE: 134 MMHG | DIASTOLIC BLOOD PRESSURE: 82 MMHG | WEIGHT: 163.8 LBS | BODY MASS INDEX: 29.96 KG/M2

## 2025-06-18 DIAGNOSIS — Z00.6 CLINICAL TRIAL PARTICIPANT: ICD-10-CM

## 2025-06-18 DIAGNOSIS — D05.12 DUCTAL CARCINOMA IN SITU (DCIS) OF LEFT BREAST: Primary | ICD-10-CM

## 2025-06-18 DIAGNOSIS — N80.129 ENDOMETRIOMA: ICD-10-CM

## 2025-06-18 DIAGNOSIS — N92.1 MENORRHAGIA WITH IRREGULAR CYCLE: ICD-10-CM

## 2025-06-18 DIAGNOSIS — D05.12 DUCTAL CARCINOMA IN SITU (DCIS) OF LEFT BREAST: ICD-10-CM

## 2025-06-18 DIAGNOSIS — Z71.2 ENCOUNTER TO DISCUSS TEST RESULTS: ICD-10-CM

## 2025-06-18 PROBLEM — R06.02 SHORTNESS OF BREATH: Status: RESOLVED | Noted: 2025-02-26 | Resolved: 2025-06-18

## 2025-06-18 PROBLEM — R63.5 WEIGHT GAIN: Status: RESOLVED | Noted: 2025-02-26 | Resolved: 2025-06-18

## 2025-06-18 PROCEDURE — 3079F DIAST BP 80-89 MM HG: CPT | Performed by: ADVANCED PRACTICE MIDWIFE

## 2025-06-18 PROCEDURE — 3075F SYST BP GE 130 - 139MM HG: CPT | Performed by: ADVANCED PRACTICE MIDWIFE

## 2025-06-18 PROCEDURE — 99417 PROLNG OP E/M EACH 15 MIN: CPT | Performed by: SURGERY

## 2025-06-18 PROCEDURE — 99213 OFFICE O/P EST LOW 20 MIN: CPT | Performed by: ADVANCED PRACTICE MIDWIFE

## 2025-06-18 PROCEDURE — 99215 OFFICE O/P EST HI 40 MIN: CPT | Performed by: SURGERY

## 2025-06-18 RX ORDER — METRONIDAZOLE 250 MG/1
250 TABLET ORAL EVERY 8 HOURS
COMMUNITY

## 2025-06-18 RX ORDER — CLARITHROMYCIN 125 MG/5ML
125 FOR SUSPENSION ORAL 2 TIMES DAILY
COMMUNITY

## 2025-06-18 RX ORDER — FERROUS SULFATE 325(65) MG
325 TABLET, DELAYED RELEASE (ENTERIC COATED) ORAL 2 TIMES DAILY
COMMUNITY
Start: 2025-04-21

## 2025-06-18 ASSESSMENT — FIBROSIS 4 INDEX
FIB4 SCORE: 0.39
FIB4 SCORE: 0.39

## 2025-06-18 NOTE — TELEPHONE ENCOUNTER
Pt informed of cardiology apptmt changed to tomorrow at  8 AM and pt will be seeing Dr. Herr today in the office as well and Dr. Herr informed of updated apptmt

## 2025-06-18 NOTE — PROGRESS NOTES
Subjective:   6/18/2025  6:37 AM    Imaging facility:  [facility]  [place] [place]   Gynecology:  JAYSON Rivera  Medical oncologist:  Dr. Bacilio Bennett 6/24/25   Oncology wellness:  Dr. An Ramos  Cardiologist:  Dr. Ruy Milan 6/19/25    Chief Complaint:   Chief Complaint   Patient presents with    Follow-Up     To discuss surgery.        History of presenting illness:  Olinda returns to discuss and finalize her surgery plan.  She desires total mastectomy and has been reading up on this.  She has seen pictures of total mastectomies without reconstruction to prepare herself better.  Her fiance's sister in law also underwent similar breast surgery and has been very helpful to her.  She signed up for the smoking cessation class which starts July 2, and is highly motivated to quit smoking.      Initial presentation:  6/2/25:  This friendly 44 y.o. year old self-identified  female is kindly referred for virtual consultation regarding left breast cancer. She denies any palpable lumps, skin changes, or nipple discharge.  No prior breast biopsies or breast surgery.      LABS:  Lab Results   Component Value Date/Time    HBA1C 5.1 05/07/2025 04:34 PM        Problem List[1]    Past Surgical History[2]    Allergies[3]    Current Outpatient Medications   Medication Sig    ferrous sulfate 325 (65 Fe) MG EC tablet Take 325 mg by mouth 2 times a day.    clarithromycin (BIAXIN) 125 MG/5ML Recon Susp Take 125 mg by mouth 2 times a day.    metroNIDAZOLE (FLAGYL) 250 MG Tab Take 250 mg by mouth every 8 hours.       Social History[4]     Social Hx - Family and Occupational[5]    Family History   Problem Relation Age of Onset    Psychiatric Illness Mother         Bipolar    Cancer Father         Liver & Lung    Lung Disease Father         Lung CA, COPD    Cancer Maternal Grandmother         lung    Psychiatric Illness Maternal Grandfather         Schizophrenia       OB  "History    Para Term  AB Living   8 4 3 0 3 3   SAB IAB Ectopic Molar Multiple Live Births   1 1 1 0 0 3   Obstetric Comments   Age of first delivery: 23   Menarche: 9   LMP: 2025   No HRT       Imaging reviewed:  Screening mammo 3/12/25 [facility]:  Heterogen dense.  Last prior mammo 2018.   LEFT upper outer cluster of microcalcs.      Left diagnostic mammo 25:     Two groupings of calcs noted in the upper outer quadrant.  More distinctly separate on ML. No associated soft tissue mass.  Indeterminate.     One group more anterior/superior.  Second group more posterior/inferior.      Total span over 4cm.      Stereo biopsies 25:   A (anterior/superior):  Seven 9g cores.  TUMARK Q postero-laterally displaced by 1.5cm..    HG DCIS with calcs and necrosis.      ER %.  GA 31-40%.     B (posterior/inferior):  Seven 9g cores.  TUMARK X medially displaced by 1cm.      HG DCIS with calcs and necrosis.    ER %. GA negative.       Objective:   /85 (BP Location: Right arm, Patient Position: Sitting, BP Cuff Size: Large adult)   Pulse 93   Temp 37 °C (98.6 °F) (Temporal)   Ht 1.575 m (5' 2\")   Wt 73.8 kg (162 lb 11.2 oz)   SpO2 100%   BMI 29.76 kg/m²       Physical Exam  Constitutional:       Appearance: Normal appearance.   Cardiovascular:      Rate and Rhythm: Normal rate and regular rhythm.      Heart sounds: Normal heart sounds.   Pulmonary:      Effort: Pulmonary effort is normal.      Breath sounds: Normal breath sounds.   Skin:     Comments: 40 DD.  Left side with more fullness overall, though also post biopsy.  She feels she is typically fairly even.  She has a stable chronic LEFT dermal cyst, about 1cm, in the 11:00, 3cm FN skin.  No palpable LAD or focal breast mass.      US shows a possible upper outer TUMARK clip, but the second central upper clip is not seen.  See scanned breast diagram   Neurological:      Mental Status: She is alert.   Psychiatric:         Mood and " Affect: Mood normal.         Behavior: Behavior normal.         FUNCTIONAL ASSESSMENT  Patient responses to questions:    Have you ever had shoulder surgery or been treated for a shoulder injury that resulted in a loss of range of motion?  No     Are you unable to reach into an upper cabinet and retrieve a cup or plate?  No     Do you have any limitations in daily activities because of your shoulder?  No     Overhead raise test for shoulder flexion:  Normal Range:  150-180 degrees    Diagnosis:     1. Ductal carcinoma in situ (DCIS) of left breast            Medical Decision Making:  Today's Assessment / Status / Plan:     ASSESSMENT:  Multifocal LEFT breast HG DCIS - total area spanned by both groupings is over 4cm:  LEFT anterior superior upper outer HG DCIS with calcs and necrosis. Clinical Stage 0 (cTis cN0 M0).  Stereotactic biopsy 25, [facility].               2.6cm calcs.  Tumark Q clip. 1.5cm posterior lateral displacement.               ER %, MN 31-40%     LEFT posterior inferior/central HG DCIS with calcs and necrosis. Clinical Stage 0 (cTis cN0 M0).  Stereotactic biopsy 25, [facility].              1cm calcs.  Tumark X clip. Medial 1cm displacement.               HG DCIS with calcifications and necrosis.               ER %, MN negative (<1%).     Last bilateral mammogram 3/12/2025 [facility]: Heterogeneously dense. Last prior mammo 2018     FH:  Father with liver (primary) and lung cancer (55), maternal grandmother with lung cancer (68), mom with lung cancer (71), paternal grandfather with tongue cancer - unknown age.    Referred to Genetics ([facility], 25).  Saliva test sent, results pending       Menopausal/HRT status:    Age of first delivery: 23  Menarche: 9  LMP: 2025  No HRT     LIFESTYLE:  Currently smokes 1/2 PPD x32 years. Starting smoking cessation class 25.  No vaping or marijuana use. Former history of meth use for 13 years, reports cessation in 2020. Previous  alcohol use, for last year and a half would drink 2-4 buzzballs daily. Quit drinking 5/15/25. BMI 30.      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      Personal history of leukemia at age 5, unknown treatment.      Hepatitis C, completed treatment and last blood work reported as undetectable.      Currently being treated with antibiotics for H. Pylori and BV.      ROS with complaints of chest pain, shortness of breath, palpitations, and lower extremity edema. Has PFT scheduled 6/4/25. Cardiology appointment 6/19/25 for surgical clearance.    ORT score 10 (HR).  No pain med agreement.      DISCUSSED:  I reviewed breast imaging, pathology reports, and relevant outside records.  We discussed the diagnosis, clinical stage, and options for management per NCCN guidelines.  I explained that the clinical (pre-surgical) stage can be different from the pathologic stage which is typically determined after surgery. While today's discussion will be focused primarily on surgical options, the post-surgical therapy can also be important to reduce the risk of the cancer recurring or spreading.  Treatment can involves a combination including surgery, medical therapy, and sometimes radiation therapy, tailored to each situation.  We discussed the features of her particular diagnosis and illustrations were used to differentiate between lobular versus ductal cancer, as well as the significance of invasive cancer versus non-invasive.     I explained that while the working diagnosis is DCIS based on the core biopsy, evaluation of the surgical specimen can reveal invasive cancer, which can change the stage, prognosis and treatment options.  She may need further surgery or other treatment if invasive cancer is identified on the surgical specimen.  However, if this is truly just Stage 0 disease, then her overall prognosis  should be excellent.      We discussed usual surgical options including breast conservation (BCS) with partial mastectomy and breast irradiation (XRT), versus total mastectomy (TM, removal of the breast on one side).  I explained the risks and benefits of each approach which also have significant differences in recovery/restrictions. I explained the use of wire localization (multiple wires in her case) to help guide the resection for non-palpable cancer.  Radiation therapy (RT) lowers the risk of same breast recurrence and is felt to provide comparable outcomes overall when added to breast conserving surgery.  However, she desires avoiding RT if at all possible and desires LEFT total mastectomy.    We discussed the risks and benefits of total mastectomy.  I explained the expected recovery, restrictions, and risks of total mastectomy (TM) with or without reconstruction, including bleeding, infection, chronic pain including phantom pain, and other undesired outcomes such as loss of nipple and skin sensation which could be permanent.  Even following total mastectomy, there can be a very low possibility of recurrence, and there can still be the need for additional therapy, depending on the findings from surgery.  I emphasized the need for ongoing self monitoring for changes of the chest wall. There are also the emotional and physical aspects which can be difficult for some women with regard to body image, self esteem, and symmetry.  We encourage reconstruction, but she understands her smoking history increases her risk for complications and is prepared for delayed reconstruction.  She has reviewed images online and knows she will be emotional at times, but feels ready to proceed.  We discussed expectations of removal of the NAC, and I explained the options for nipple reconstruction, tattoo, 3D nipple coloration, and other possibilities following excision of the nipple areolar complex (NAC).      I explained the role of  axillary (armpit) node evaluation which is typically done with surgery.  However, the risks include lymphedema, nerve damage, mobility restrictions, and other unintended consequences.  Risks of lymphedema are lifelong and require vigilance for early symptoms.  We therefore try to be judicious with our recommendations.  Typically, DCIS does not require node evaluation unless upgraded to an invasive cancer, but if an invasive cancer is identified after a large enough resection to disrupt the most critical lymphatic pathways, our ability to perform lymphatic mapping for more selective node removal may be lost.  We discussed the option of Magtrace injection that could avoid axillary surgery enitrely yet preserve sentinel mapping functionality, in the event that her pathology results make us reconsider axillary staging.  I explained that there can be staining of the areola which may be permanent, though most patients report no concern with cosmetic appearance long term.  After discussing further, she wishes to proceed with Magtrace and accepts the possibility of needing a second surgery, in order to avoid potential complications of axillary surgery upfront.     She already has her appointment with Dr. Bennett, though shares that she was very frightened when that office called as she had not expected it.  I explained that many patients with breast cancer require systemic therapy of some kind, but if she turns out to have only DCIS, she may not need additional therapy.      Her genetic panel is pending (saliva test).  She is already connected with the ON[facility] and  who are assisting with her barriers to care.  She met with Dr. An Ramos from [facility].      Total time spent today by me, including personal review and independent interpretation of available breast imaging and pathology report, outside records, face to face time (40min for this alone), coordination of care, chart documentation,  and , was 85 minutes.  Ample time was provided for questions, and we provided a written outline of the treatment plan, reflecting her choices utilizing a shared decision making approach.  Our team will reach out to her over the next week to review additional resources and further screen for any oncology wellness needs.           PRESCRIPTION FOR CONTROLLED SUBSTANCE:  This patient may develop post-procedure pain that could exceed the capabilities of non-opioid medications such as acetaminophen, ibuprofen, or naproxen.  This patient may also require an anxiolytic for pre-procedure anxiety.  There are no sufficient alternatives to this medication that are currently available.  The patient understands that the purpose of opioid medication is to improve initial post-procedure symptoms, then transition to alternative forms of treatment, and is not intended for long-term use.  The patient has been advised not to use the controlled substances with alcohol, marijuana products, or other illicit drugs.  I have reviewed available medical records from this patient's other providers and have directed my staff to obtain pertinent medical records as we are made aware.  After performing my evaluation and risk assessment, I feel that the controlled substances to be prescribed are appropriate.  Drug utilization report is reviewed prior to written prescriptions.  The patient was given the opportunity to ask questions regarding controlled substances.      PLAN:  Cardiology clearance appointment 6/19/25  When cleared:  LEFT total mastectomy, LEFT Magtrace injection         [1]   Patient Active Problem List  Diagnosis    Hepatitis C-no treatment, dx 1999, IV drug hx.    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 at SURGERY [facility] ORS    ABDOMINAL EXPLORATION      eptopic oreg 1999    OTHER ABDOMINAL SURGERY      etopic preg 1999   [3] No Known Allergies  [4]   Social History  Tobacco Use    Smoking status: Every Day     Current packs/day: 1.00     Average packs/day: 1 pack/day for 19.0 years (19.0 ttl pk-yrs)     Types: Cigarettes    Smokeless tobacco: Never   Vaping Use    Vaping status: Never Used   Substance Use Topics    Alcohol use: Yes     Comment: rare    Drug use: Not Currently     Types: Methamphetamines     Comment: meth 13 years, Quit 3 yrs ago   [5]   Family and Occupational History  Socioeconomic History    Marital status: Single     Spouse name: Not on file    Number of children: Not on file    Years of education: Not on file    Highest education level: Not on file   Occupational History    Not on file

## 2025-06-18 NOTE — PROGRESS NOTES
Talia Rose Salvatelli Moniz is a 45 y.o. female who presents for follow up        HPI Comments: Pt presents for follow up.  No LMP recorded. (Menstrual status: Irregular Menses).. Patient was seen approximately 6 weeks ago for irregular bleeding. US and vaginal swabs completed. Had yeast, chlamydia, and mycoplasma infection. She completed diflucan and azithromycin. Needs SHANTANU today but started menses yesterday. Recent diagnosis of DCIS and patient planning left mastectomy next week. Currently being treated for H Pylori as well.  Patient admits to being overwhelmed with large number of antibiotics she has been prescribed. She is concerned about compliance.      Review of Systems   Pertinent positives documented in HPI and all other systems reviewed & are negative      Past Medical History[1]    Medications: Current Medications and Prescriptions Ordered in Epic[2]       Objective:   Vital measurements:  /82   Wt 163 lb 12.8 oz   Body mass index is 29.96 kg/m². (Goal BM I>18 <25)    Physical Exam   Nursing note and vitals reviewed.  Constitutional: She is oriented to person, place, and time. She appears well-developed and well-nourished. No distress.     Musculoskeletal: Normal range of motion. She exhibits no edema and no tenderness.     Skin: Skin is warm and dry. No rash noted. She is not diaphoretic. No erythema. No pallor.     Psychiatric: She has a normal mood and affect. Her behavior is normal. Judgment and thought content normal.            Assessment:     1. Encounter to discuss test results        2. Ductal carcinoma in situ (DCIS) of left breast        3. Menorrhagia with irregular cycle  US-PELVIC COMPLETE (TRANSABDOMINAL/TRANSVAGINAL) (COMBO)      4. Endometrioma  US-PELVIC COMPLETE (TRANSABDOMINAL/TRANSVAGINAL) (COMBO)          Plan:   Discussed recent DCIS diagnosis with patient. I discussed that without genetic testing indicating high risk BRCA, hysterectomy is not indicated. She has already had  bilateral salpingectomy years ago.   We discussed repeat ultrasound in 8 weeks to reevaluate endometrioma vs hemorrhagic cyst. She is happy with this plan especially with recent diagnosis. Did discuss that in most cases, spontaneous resolution will occur.   Patient has not completed doxycycline at this time and has two other antibiotics. I have recommended that she wait until post op to start the twice daily medication as it requires subsequent antibiotics. However, with as many antibiotics as patient has had, may complete SHANTANU after doxy alone. She agrees             [1]   Past Medical History:  Diagnosis Date    Anemia     Anxiety     Bipolar affective disorder (HCC) Dx 2008    no meds    Bipolar disorder (HCC) 01/01/2008    no meds    Blood transfusion     1999 eptopic preg    Hepatitis C     Hepatitis C 03/22/2010    Hepatitis C     Hypertension     Infectious disease     hep c    Substance abuse (HCC)     Pt. states last used Meth 3 years ago.    [2]   Current Outpatient Medications Ordered in Epic   Medication Sig Dispense Refill    ferrous sulfate 325 (65 Fe) MG EC tablet Take 325 mg by mouth 2 times a day.      clarithromycin (BIAXIN) 125 MG/5ML Recon Susp Take 125 mg by mouth 2 times a day.      metroNIDAZOLE (FLAGYL) 250 MG Tab Take 250 mg by mouth every 8 hours.       No current [facility]-ordered facility-administered medications on file.

## 2025-06-18 NOTE — PROGRESS NOTES
Pt here for GYN follow up.    Pt states she would like to discuss u/s results   Good#: [phone]  LMP: 6/18/2025  Pharmacy confirmed.

## 2025-06-19 ENCOUNTER — PATIENT OUTREACH (OUTPATIENT)
Dept: ONCOLOGY | Facility: MEDICAL CENTER | Age: 45
End: 2025-06-19

## 2025-06-19 ENCOUNTER — TELEPHONE (OUTPATIENT)
Dept: CARDIOLOGY | Facility: MEDICAL CENTER | Age: 45
End: 2025-06-19
Payer: MEDICAID

## 2025-06-19 ENCOUNTER — PATIENT MESSAGE (OUTPATIENT)
Age: 45
End: 2025-06-19
Payer: MEDICAID

## 2025-06-19 ENCOUNTER — OFFICE VISIT (OUTPATIENT)
Dept: CARDIOLOGY | Facility: MEDICAL CENTER | Age: 45
End: 2025-06-19
Attending: INTERNAL MEDICINE
Payer: MEDICAID

## 2025-06-19 VITALS
OXYGEN SATURATION: 97 % | DIASTOLIC BLOOD PRESSURE: 70 MMHG | RESPIRATION RATE: 18 BRPM | HEART RATE: 83 BPM | WEIGHT: 163 LBS | BODY MASS INDEX: 30 KG/M2 | SYSTOLIC BLOOD PRESSURE: 110 MMHG | HEIGHT: 62 IN

## 2025-06-19 DIAGNOSIS — F17.200 TOBACCO DEPENDENCE: ICD-10-CM

## 2025-06-19 DIAGNOSIS — Z01.818 ENCOUNTER FOR PREOPERATIVE ASSESSMENT: Primary | ICD-10-CM

## 2025-06-19 PROCEDURE — 99212 OFFICE O/P EST SF 10 MIN: CPT | Performed by: INTERNAL MEDICINE

## 2025-06-19 PROCEDURE — 99406 BEHAV CHNG SMOKING 3-10 MIN: CPT | Performed by: INTERNAL MEDICINE

## 2025-06-19 ASSESSMENT — ENCOUNTER SYMPTOMS
PALPITATIONS: 0
COUGH: 0
MYALGIAS: 0
PND: 0
DIZZINESS: 0
SHORTNESS OF BREATH: 0
ORTHOPNEA: 0
LOSS OF CONSCIOUSNESS: 0

## 2025-06-19 ASSESSMENT — FIBROSIS 4 INDEX: FIB4 SCORE: 0.39

## 2025-06-19 NOTE — PROGRESS NOTES
Cardiology Initial Consultation Note    Date of note:    6/19/2025    Primary Care Provider: KATINA Wei  Referring Provider: Kendra Fuller A.*    Patient Name: Salvatelli Moniz , Talia Rose   YOB: 1980  MRN:              [id number]    Chief Complaint   Patient presents with    Nicotine Dependence     NP Dx: Tobacco dependence-smokes 1/2 ppd       History of Present Illness: Ms. Talia Salvatelli Moniz is a 45-year-old woman with past medical history significant for breast cancer, tobacco dependence who presents to the cardiology office for perioperative risk assessment.    She will be undergoing left total mastectomy next week and was referred urgently to our office for perioperative risk assessment.    Patient states that she works as a  and performs strenuous activity on a daily basis.  She is able to walk up 2 flights of stairs without chest pain or dyspnea.  Currently denies having any major cardiac symptoms.  No lower extremity edema.  No orthopnea.  No chest pain or exertional dyspnea.    She continues to smoke cigarettes.  She has smoked for over a decade.  Currently smokes about half a pack to 1 pack of cigarettes on a daily basis    Cardiovascular Risk Factors:  1. Smoking status: Yes  2. Type II Diabetes Mellitus: Denies   Lab Results   Component Value Date/Time    HBA1C 5.1 05/07/2025 04:34 PM     3. Hypertension: Denies  4. Dyslipidemia: Denies  Cholesterol,Tot   Date Value Ref Range Status   05/07/2025 195 100 - 199 mg/dL Final     LDL   Date Value Ref Range Status   05/07/2025 106 (H) <100 mg/dL Final     HDL   Date Value Ref Range Status   05/07/2025 60 >=40 mg/dL Final     Triglycerides   Date Value Ref Range Status   05/07/2025 145 0 - 149 mg/dL Final     5. Family history of early Coronary Artery Disease in a first degree relative (Male less than 55 years of age; Female less than 65 years of age): Denies      Review of Systems:  Review of Systems    Respiratory:  Negative for cough and shortness of breath.    Cardiovascular:  Negative for chest pain, palpitations, orthopnea, leg swelling and PND.   Musculoskeletal:  Negative for joint pain and myalgias.   Neurological:  Negative for dizziness and loss of consciousness.       Past Medical History[1]      Past Surgical History[2]      Current Medications[3]      Allergies[4]      Family History   Problem Relation Age of Onset    Psychiatric Illness Mother         Bipolar    Cancer Father         Liver & Lung    Lung Disease Father         Lung CA, COPD    Cancer Maternal Grandmother         lung    Psychiatric Illness Maternal Grandfather         Schizophrenia         Social History     Socioeconomic History    Marital status: Single     Spouse name: Not on file    Number of children: Not on file    Years of education: Not on file    Highest education level: GED or equivalent   Occupational History    Not on file   Tobacco Use    Smoking status: Every Day     Current packs/day: 1.00     Average packs/day: 1 pack/day for 19.0 years (19.0 ttl pk-yrs)     Types: Cigarettes    Smokeless tobacco: Never   Vaping Use    Vaping status: Never Used   Substance and Sexual Activity    Alcohol use: Yes     Comment: rare    Drug use: Not Currently     Types: Methamphetamines     Comment: meth 13 years, Quit 3 yrs ago    Sexual activity: Yes     Partners: Male     Birth control/protection: None     Comment: None   Other Topics Concern    Not on file   Social History Narrative    ** Merged History Encounter **          Social Drivers of Health     Financial Resource Strain: High Risk (6/19/2025)    Overall Financial Resource Strain (CARDIA)     Difficulty of Paying Living Expenses: Hard   Food Insecurity: Food Insecurity Present (6/19/2025)    Hunger Vital Sign     Worried About Running Out of Food in the Last Year: Sometimes true     Ran Out of Food in the Last Year: Sometimes true   Transportation Needs: Unmet Transportation  "Needs (6/19/2025)    PRAPARE - Transportation     Lack of Transportation (Medical): Yes     Lack of Transportation (Non-Medical): Yes   Physical Activity: Sufficiently Active (6/19/2025)    Exercise Vital Sign     Days of Exercise per Week: 5 days     Minutes of Exercise per Session: 120 min   Stress: Stress Concern Present (6/19/2025)    [employer] - Occupational Stress Questionnaire     Feeling of Stress : To some extent   Social Connections: Moderately Integrated (6/19/2025)    Social Connection and Isolation Panel [NHANES]     Frequency of Communication with Friends and Family: More than three times a week     Frequency of Social Gatherings with Friends and Family: More than three times a week     Attends Taoism Services: More than 4 times per year     Active Member of Clubs or Organizations: Yes     Attends Club or Organization Meetings: More than 4 times per year     Marital Status:    Intimate Partner Violence: Not on file   Housing Stability: High Risk (6/19/2025)    Housing Stability Vital Sign     Unable to Pay for Housing in the Last Year: Yes     Number of Times Moved in the Last Year: 2     Homeless in the Last Year: Yes         Physical Exam:  Ambulatory Vitals  /70 (BP Location: Left arm, Patient Position: Sitting, BP Cuff Size: Adult)   Pulse 83   Resp 18   Ht 1.575 m (5' 2\")   Wt 73.9 kg (163 lb)   SpO2 97%    Oxygen Therapy:  Pulse Oximetry: 97 %  BP Readings from Last 4 Encounters:   06/19/25 110/70   06/18/25 134/82   06/18/25 132/85   06/12/25 118/78       Weight/BMI: Body mass index is 29.81 kg/m².  Wt Readings from Last 4 Encounters:   06/19/25 73.9 kg (163 lb)   06/18/25 74.3 kg (163 lb 12.8 oz)   06/18/25 73.8 kg (162 lb 11.2 oz)   06/12/25 74.1 kg (163 lb 4.8 oz)         General: Not in acute distress  HEENT: OP clear   Neck:  No carotid bruits, No JVD appreciated  CVS:  RRR, Normal S1, S2. No murmurs, rubs or gallops  Resp: Normal " respiratory effort, lungs CTA bilaterally.  Abdomen: Soft, non-distended  Neurological: Alert and oriented x3, moves all extremities, no focal neurologic deficits  Psychiatric: Appropriate affect  Extremities:   Extremities warm, 2+ bilateral radial pulses.  2+ bilateral dp pulses, no lower extremity edema bilaterally      Lab Data Review:  Lab Results   Component Value Date/Time    CHOLSTRLTOT 195 05/07/2025 04:34 PM     (H) 05/07/2025 04:34 PM    HDL 60 05/07/2025 04:34 PM    TRIGLYCERIDE 145 05/07/2025 04:34 PM       Lab Results   Component Value Date/Time    SODIUM 134 (L) 05/07/2025 04:34 PM    POTASSIUM 4.2 05/07/2025 04:34 PM    CHLORIDE 102 05/07/2025 04:34 PM    CO2 21 05/07/2025 04:34 PM    GLUCOSE 82 05/07/2025 04:34 PM    BUN 11 05/07/2025 04:34 PM    CREATININE 0.80 05/07/2025 04:34 PM    CREATININE 0.8 12/01/2008 07:24 AM    BUNCREATRAT 12.0 04/20/2025 11:48 PM     Lab Results   Component Value Date/Time    ALKPHOSPHAT 80 05/07/2025 04:34 PM    ASTSGOT 19 05/07/2025 04:34 PM    ALTSGPT 18 05/07/2025 04:34 PM    TBILIRUBIN <0.2 05/07/2025 04:34 PM      Lab Results   Component Value Date/Time    WBC 6.5 05/07/2025 04:34 PM    HEMOGLOBIN 10.7 (L) 05/07/2025 04:34 PM     Lab Results   Component Value Date/Time    HBA1C 5.1 05/07/2025 04:34 PM         Cardiac Imaging and Procedures Review:    EKG dated 2/26/2025:   My personal interpretation is sinus rhythm, no ischemic changes.    Echo dated 3/14/2025:   No prior study is available for comparison.   The left ventricular ejection fraction is visually estimated to be 60-65%.  The right ventricle is normal in size and systolic function.  No significant valvular disease.         Assessment & Plan     1. Encounter for preoperative assessment        2. Tobacco dependence-smokes 1/2 ppd               Medical Decision Making:  Ms. Talia Salvatelli Moniz is a 45-year-old woman with past medical history significant for breast cancer, tobacco dependence who  presents to the cardiology office for perioperative risk assessment.    1. Encounter for preoperative assessment (Primary)  -Patient presents to the office for perioperative risk assessment.  She will be undergoing a moderate risk surgery next week with the left total mastectomy.  She is currently stable from a cardiovascular standpoint.  She is able to achieve greater than 4 METS without cardiac limitations.  Most recent EKG shows sinus rhythm without ischemic changes.  She had echocardiogram performed on 3/14/2025 which shows normal LV function without regional wall motion abnormalities and no major valvular disease.  - Patient is at low risk for Mace during proposed moderate risk surgery.  No additional preoperative cardiac testing recommended at this time.  - Requested paperwork/clearance will be filled out and faxed to her surgeon    2. Tobacco dependence-smokes 1/2 ppd   -Detailed discussion with the patient regarding importance of tobacco cessation.  Advised tobacco cessation given harmful cardiovascular effects of ongoing tobacco use.  -I spent 4 minutes of face to face counseling on the vital need for smoking cessation.  We discussed pharmacotherapy measures for quiting including nicotine replacement.        It was a pleasure seeing Ms. Talia Salvatelli Moniz in the office today. Return if symptoms worsen or fail to improve. Patient is aware to call the cardiology clinic with any questions or concerns.      Ruy Milan MD, [facility]  Cardiologist, [facility], [facility]  [address] [address]  Phone: [phone]  Fax: [phone]    Please note that this dictation was created using voice recognition software. I have made every reasonable attempt to correct obvious errors, but it is possible there are errors of grammar and possibly content that I did not discover before finalizing the note.       [1]   Past Medical  History:  Diagnosis Date    Anemia     Anxiety     Bipolar affective disorder (HCC) Dx 2008    no meds    Bipolar disorder (HCC) 01/01/2008    no meds    Blood transfusion     1999 eptopic preg    Hepatitis C     Hepatitis C 03/22/2010    Hepatitis C     Hypertension     Infectious disease     hep c    Substance abuse (HCC)     Pt. states last used Meth 3 years ago.    [2]   Past Surgical History:  Procedure Laterality Date    PELVISCOPY  1/28/2014    Performed by Katrin Castro M.D. at SURGERY [facility]    ABDOMINAL EXPLORATION      eptopic oreg 1999    OTHER ABDOMINAL SURGERY      etopic preg 1999   [3]   Current Outpatient Medications   Medication Sig Dispense Refill    ferrous sulfate 325 (65 Fe) MG EC tablet Take 325 mg by mouth 2 times a day.      clarithromycin (BIAXIN) 125 MG/5ML Recon Susp Take 125 mg by mouth 2 times a day.      metroNIDAZOLE (FLAGYL) 250 MG Tab Take 250 mg by mouth every 8 hours.       No current facility-administered medications for this visit.   [4] No Known Allergies

## 2025-06-19 NOTE — PROGRESS NOTES
On June 19, 2025, , Rosie Aguilar, called pt. to check in. Pt. reported that she is doing well but she is “tired”. Pt. confirmed that she has surgery with Dr. Herr next Thursday. Pt. feels ready for surgery and pt.'s son (age 21) will be coming with her and will be assisting her while in recovery. Pt. reported that she has other family and friends that will be stepping in to help care for her. Pt. feels like she has proper support at this time. TAYLOR Aguilar provided active listening and emotional support to pt. around her fears about the surgery and the fact that pt. is losing a breast. TAYLOR Aguilar encouraged pt. to again look at the resources provided for support groups when she feels like she is ready to discuss her situation.     Pt. is concerned about her financial situation as pt. has had to take time off work due to fatigue and appointments. Pt.'s income has been lower this month and pt. is worried about paying rent for July. TAYLOR Aguilar and pt. discussed some community resources, such as [employer] and [employer] rental assistance. TAYLOR Aguilar explained that the breast cancer resources (aside from the breast fund) are not usually immediate if they are granted. TAYLOR Aguilar explained that for some of the resources, patients need to be in active treatment, which pt. is not in yet. TAYLOR Aguilar and pt. discussed having pt. apply for [employer] even though pt. has Medicaid due to pt. having a loss of income due to appointments and upcoming surgery. TAYLOR Aguilar provided instructions on the application and KRISTIN needed.     Pt. reported that she had received an email from the [place] [facility] [employer] saying that she needs to apply through a . Pt. reported that she is unsure how they got her email. TAYLOR Aguilar explained that patients usually need to be in active treatment for them to look at the application. TAYLOR Aguilar explained that plethora of resources that are available for breast cancer patients should be  explored before applying for RCF.      Pt. stated that she feels like she is emotionally doing well. Pt. reported that she is sleeping well and feels like she is sleeping “too much”. TAYLOR Aguilar encouraged pt. to rest as much as she can as she prepares for surgery. TAYLOR Aguilar inquired about information in pt.'s medical chart regarding past substance use, including alcohol and meth use. Pt. stated that she does not use either substance anymore and that it is not a concern for her at this time. Pt. reported that she stopped drinking after learning about the link between cancer and alcohol. Pt. reported that she does not feel the urge to use substances at this time. TAYLOR Aguilar encouraged pt. to reach out to TAYLOR Aguilar or DEEPA Jesus if she feels like she needs support in that area. TAYLOR Aguilar informed pt. that the team is not here to  her, but for support and to ensure she is able to get through her treatment and next steps.     Pt. asked TAYLOR Aguilar if she is supposed to have a pre-op appointment as she does not have one scheduled before surgery next week. TAYLOR Aguilar sent a message to DEEPA Jesus to inquire.     TAYLOR Aguilar encouraged pt. to reach out at any time and the team will continue to talk with pt.

## 2025-06-23 ENCOUNTER — TELEPHONE (OUTPATIENT)
Facility: MEDICAL CENTER | Age: 45
End: 2025-06-23
Payer: MEDICAID

## 2025-06-23 ENCOUNTER — PRE-ADMISSION TESTING (OUTPATIENT)
Dept: ADMISSIONS | Facility: MEDICAL CENTER | Age: 45
End: 2025-06-23
Payer: MEDICAID

## 2025-06-23 ENCOUNTER — TELEPHONE (OUTPATIENT)
Age: 45
End: 2025-06-23
Payer: MEDICAID

## 2025-06-23 NOTE — TELEPHONE ENCOUNTER
LVM for patient to return my call at medical oncology. Pt spoke with  staff, Minerva Saldana, and stated she needed to cancel her appointment with Dr. Bennett on 6/24/25 as her surgery is scheduled for the same day.     I LVM to reschedule her appointment. Office phone number provided for call back.

## 2025-06-23 NOTE — PREADMIT AVS NOTE
No Current Medications   Medication Instructions      Based on outpatient records, baseline Hgb in 11 range. No reported bleeding.   -iron studies in AM Based on outpatient records, baseline Hgb in 11 range. No reported bleeding.   -iron studies within normal limits. Likely in setting of acute illness. will trend.

## 2025-06-24 ENCOUNTER — PHARMACY VISIT (OUTPATIENT)
Dept: PHARMACY | Facility: MEDICAL CENTER | Age: 45
End: 2025-06-24
Payer: COMMERCIAL

## 2025-06-24 ENCOUNTER — PATIENT OUTREACH (OUTPATIENT)
Dept: ONCOLOGY | Facility: MEDICAL CENTER | Age: 45
End: 2025-06-24
Payer: MEDICAID

## 2025-06-24 ENCOUNTER — HOSPITAL ENCOUNTER (OUTPATIENT)
Facility: MEDICAL CENTER | Age: 45
End: 2025-06-24
Attending: SURGERY | Admitting: SURGERY
Payer: MEDICAID

## 2025-06-24 ENCOUNTER — APPOINTMENT (OUTPATIENT)
Dept: RADIOLOGY | Facility: MEDICAL CENTER | Age: 45
End: 2025-06-24
Attending: SURGERY
Payer: MEDICAID

## 2025-06-24 ENCOUNTER — ANESTHESIA EVENT (OUTPATIENT)
Dept: SURGERY | Facility: MEDICAL CENTER | Age: 45
End: 2025-06-24
Payer: MEDICAID

## 2025-06-24 ENCOUNTER — ANESTHESIA (OUTPATIENT)
Dept: SURGERY | Facility: MEDICAL CENTER | Age: 45
End: 2025-06-24
Payer: MEDICAID

## 2025-06-24 ENCOUNTER — APPOINTMENT (OUTPATIENT)
Facility: MEDICAL CENTER | Age: 45
End: 2025-06-24
Attending: INTERNAL MEDICINE
Payer: MEDICAID

## 2025-06-24 VITALS
BODY MASS INDEX: 29.37 KG/M2 | WEIGHT: 159.61 LBS | DIASTOLIC BLOOD PRESSURE: 77 MMHG | OXYGEN SATURATION: 94 % | HEIGHT: 62 IN | HEART RATE: 80 BPM | TEMPERATURE: 98 F | RESPIRATION RATE: 16 BRPM | SYSTOLIC BLOOD PRESSURE: 104 MMHG

## 2025-06-24 DIAGNOSIS — D05.12 DUCTAL CARCINOMA IN SITU (DCIS) OF LEFT BREAST: Primary | ICD-10-CM

## 2025-06-24 DIAGNOSIS — D05.12 INTRADUCTAL CARCINOMA IN SITU OF LEFT BREAST: ICD-10-CM

## 2025-06-24 LAB
ALBUMIN SERPL BCP-MCNC: 4.1 G/DL (ref 3.2–4.9)
ALBUMIN/GLOB SERPL: 1.5 G/DL
ALP SERPL-CCNC: 69 U/L (ref 30–99)
ALT SERPL-CCNC: 14 U/L (ref 2–50)
ANION GAP SERPL CALC-SCNC: 12 MMOL/L (ref 7–16)
AST SERPL-CCNC: 18 U/L (ref 12–45)
BILIRUB SERPL-MCNC: <0.2 MG/DL (ref 0.1–1.5)
BUN SERPL-MCNC: 12 MG/DL (ref 8–22)
CALCIUM ALBUM COR SERPL-MCNC: 8.8 MG/DL (ref 8.5–10.5)
CALCIUM SERPL-MCNC: 8.9 MG/DL (ref 8.5–10.5)
CHLORIDE SERPL-SCNC: 103 MMOL/L (ref 96–112)
CO2 SERPL-SCNC: 21 MMOL/L (ref 20–33)
CREAT SERPL-MCNC: 0.97 MG/DL (ref 0.5–1.4)
ERYTHROCYTE [DISTWIDTH] IN BLOOD BY AUTOMATED COUNT: 46.1 FL (ref 35.9–50)
GFR SERPLBLD CREATININE-BSD FMLA CKD-EPI: 73 ML/MIN/1.73 M 2
GLOBULIN SER CALC-MCNC: 2.8 G/DL (ref 1.9–3.5)
GLUCOSE SERPL-MCNC: 85 MG/DL (ref 65–99)
HCG UR QL: NEGATIVE
HCT VFR BLD AUTO: 34.4 % (ref 37–47)
HGB BLD-MCNC: 10.7 G/DL (ref 12–16)
MCH RBC QN AUTO: 24.4 PG (ref 27–33)
MCHC RBC AUTO-ENTMCNC: 31.1 G/DL (ref 32.2–35.5)
MCV RBC AUTO: 78.5 FL (ref 81.4–97.8)
PLATELET # BLD AUTO: 454 K/UL (ref 164–446)
PMV BLD AUTO: 10.2 FL (ref 9–12.9)
POTASSIUM SERPL-SCNC: 4.1 MMOL/L (ref 3.6–5.5)
PROT SERPL-MCNC: 6.9 G/DL (ref 6–8.2)
RBC # BLD AUTO: 4.38 M/UL (ref 4.2–5.4)
SODIUM SERPL-SCNC: 136 MMOL/L (ref 135–145)
WBC # BLD AUTO: 6.5 K/UL (ref 4.8–10.8)

## 2025-06-24 PROCEDURE — 700101 HCHG RX REV CODE 250: Mod: UD | Performed by: ANESTHESIOLOGY

## 2025-06-24 PROCEDURE — 160193 HCHG PACU STANDARD - 1ST 60 MINS: Performed by: SURGERY

## 2025-06-24 PROCEDURE — 700101 HCHG RX REV CODE 250: Mod: UD | Performed by: SURGERY

## 2025-06-24 PROCEDURE — 80053 COMPREHEN METABOLIC PANEL: CPT

## 2025-06-24 PROCEDURE — A9270 NON-COVERED ITEM OR SERVICE: HCPCS | Mod: UD | Performed by: ANESTHESIOLOGY

## 2025-06-24 PROCEDURE — 160028 HCHG SURGERY MINUTES - 1ST 30 MINS LEVEL 3: Performed by: SURGERY

## 2025-06-24 PROCEDURE — 88307 TISSUE EXAM BY PATHOLOGIST: CPT | Mod: 26 | Performed by: PATHOLOGY

## 2025-06-24 PROCEDURE — 700102 HCHG RX REV CODE 250 W/ 637 OVERRIDE(OP): Mod: UD | Performed by: ANESTHESIOLOGY

## 2025-06-24 PROCEDURE — 160046 HCHG PACU - 1ST 60 MINS PHASE II: Performed by: SURGERY

## 2025-06-24 PROCEDURE — 160015 HCHG STAT PREOP MINUTES: Performed by: SURGERY

## 2025-06-24 PROCEDURE — 160048 HCHG OR STATISTICAL LEVEL 1-5: Performed by: SURGERY

## 2025-06-24 PROCEDURE — 81025 URINE PREGNANCY TEST: CPT

## 2025-06-24 PROCEDURE — 160047 HCHG PACU  - EA ADDL 30 MINS PHASE II: Performed by: SURGERY

## 2025-06-24 PROCEDURE — 700105 HCHG RX REV CODE 258: Mod: UD | Performed by: ANESTHESIOLOGY

## 2025-06-24 PROCEDURE — 160039 HCHG SURGERY MINUTES - EA ADDL 1 MIN LEVEL 3: Performed by: SURGERY

## 2025-06-24 PROCEDURE — 160002 HCHG RECOVERY MINUTES (STAT): Performed by: SURGERY

## 2025-06-24 PROCEDURE — 36415 COLL VENOUS BLD VENIPUNCTURE: CPT

## 2025-06-24 PROCEDURE — 700111 HCHG RX REV CODE 636 W/ 250 OVERRIDE (IP): Mod: UD | Performed by: ANESTHESIOLOGY

## 2025-06-24 PROCEDURE — 160025 RECOVERY II MINUTES (STATS): Performed by: SURGERY

## 2025-06-24 PROCEDURE — 88307 TISSUE EXAM BY PATHOLOGIST: CPT | Mod: 59 | Performed by: PATHOLOGY

## 2025-06-24 PROCEDURE — 85027 COMPLETE CBC AUTOMATED: CPT

## 2025-06-24 PROCEDURE — 700111 HCHG RX REV CODE 636 W/ 250 OVERRIDE (IP): Mod: JZ,UD | Performed by: ANESTHESIOLOGY

## 2025-06-24 PROCEDURE — 160191 HCHG ANESTHESIA STANDARD: Performed by: SURGERY

## 2025-06-24 PROCEDURE — RXMED WILLOW AMBULATORY MEDICATION CHARGE

## 2025-06-24 PROCEDURE — 76098 X-RAY EXAM SURGICAL SPECIMEN: CPT | Mod: LT

## 2025-06-24 RX ORDER — BUPIVACAINE HYDROCHLORIDE AND EPINEPHRINE 5; 5 MG/ML; UG/ML
INJECTION, SOLUTION EPIDURAL; INTRACAUDAL; PERINEURAL
Status: DISCONTINUED | OUTPATIENT
Start: 2025-06-24 | End: 2025-06-24 | Stop reason: HOSPADM

## 2025-06-24 RX ORDER — OXYCODONE HCL 5 MG/5 ML
5 SOLUTION, ORAL ORAL
Status: COMPLETED | OUTPATIENT
Start: 2025-06-24 | End: 2025-06-24

## 2025-06-24 RX ORDER — ONDANSETRON 2 MG/ML
INJECTION INTRAMUSCULAR; INTRAVENOUS PRN
Status: DISCONTINUED | OUTPATIENT
Start: 2025-06-24 | End: 2025-06-24 | Stop reason: SURG

## 2025-06-24 RX ORDER — TRAMADOL HYDROCHLORIDE 50 MG/1
50 TABLET ORAL EVERY 6 HOURS PRN
Qty: 12 TABLET | Refills: 0 | Status: SHIPPED | OUTPATIENT
Start: 2025-06-24 | End: 2025-06-27

## 2025-06-24 RX ORDER — SODIUM CHLORIDE, SODIUM LACTATE, POTASSIUM CHLORIDE, CALCIUM CHLORIDE 600; 310; 30; 20 MG/100ML; MG/100ML; MG/100ML; MG/100ML
INJECTION, SOLUTION INTRAVENOUS CONTINUOUS
Status: DISCONTINUED | OUTPATIENT
Start: 2025-06-24 | End: 2025-06-24 | Stop reason: HOSPADM

## 2025-06-24 RX ORDER — ALPRAZOLAM 0.25 MG
.25-.5 TABLET ORAL
Status: DISCONTINUED | OUTPATIENT
Start: 2025-06-24 | End: 2025-06-24 | Stop reason: HOSPADM

## 2025-06-24 RX ORDER — BUPIVACAINE HYDROCHLORIDE AND EPINEPHRINE 5; 5 MG/ML; UG/ML
INJECTION, SOLUTION EPIDURAL; INTRACAUDAL; PERINEURAL
Status: DISCONTINUED
Start: 2025-06-24 | End: 2025-06-24 | Stop reason: HOSPADM

## 2025-06-24 RX ORDER — HYDROMORPHONE HYDROCHLORIDE 1 MG/ML
0.4 INJECTION, SOLUTION INTRAMUSCULAR; INTRAVENOUS; SUBCUTANEOUS
Status: DISCONTINUED | OUTPATIENT
Start: 2025-06-24 | End: 2025-06-24 | Stop reason: HOSPADM

## 2025-06-24 RX ORDER — SODIUM CHLORIDE, SODIUM LACTATE, POTASSIUM CHLORIDE, CALCIUM CHLORIDE 600; 310; 30; 20 MG/100ML; MG/100ML; MG/100ML; MG/100ML
INJECTION, SOLUTION INTRAVENOUS
Status: DISCONTINUED | OUTPATIENT
Start: 2025-06-24 | End: 2025-06-24 | Stop reason: SURG

## 2025-06-24 RX ORDER — OXYCODONE HCL 5 MG/5 ML
10 SOLUTION, ORAL ORAL
Status: COMPLETED | OUTPATIENT
Start: 2025-06-24 | End: 2025-06-24

## 2025-06-24 RX ORDER — DIPHENHYDRAMINE HYDROCHLORIDE 50 MG/ML
12.5 INJECTION, SOLUTION INTRAMUSCULAR; INTRAVENOUS
Status: DISCONTINUED | OUTPATIENT
Start: 2025-06-24 | End: 2025-06-24 | Stop reason: HOSPADM

## 2025-06-24 RX ORDER — LIDOCAINE HYDROCHLORIDE 20 MG/ML
INJECTION, SOLUTION EPIDURAL; INFILTRATION; INTRACAUDAL; PERINEURAL PRN
Status: DISCONTINUED | OUTPATIENT
Start: 2025-06-24 | End: 2025-06-24 | Stop reason: SURG

## 2025-06-24 RX ORDER — ONDANSETRON 4 MG/1
4 TABLET, FILM COATED ORAL EVERY 6 HOURS PRN
Qty: 8 TABLET | Refills: 0 | Status: SHIPPED | OUTPATIENT
Start: 2025-06-24 | End: 2025-06-26

## 2025-06-24 RX ORDER — MIDAZOLAM HYDROCHLORIDE 1 MG/ML
INJECTION INTRAMUSCULAR; INTRAVENOUS PRN
Status: DISCONTINUED | OUTPATIENT
Start: 2025-06-24 | End: 2025-06-24 | Stop reason: SURG

## 2025-06-24 RX ORDER — ONDANSETRON 2 MG/ML
4 INJECTION INTRAMUSCULAR; INTRAVENOUS
Status: COMPLETED | OUTPATIENT
Start: 2025-06-24 | End: 2025-06-24

## 2025-06-24 RX ORDER — DEXAMETHASONE SODIUM PHOSPHATE 4 MG/ML
INJECTION, SOLUTION INTRA-ARTICULAR; INTRALESIONAL; INTRAMUSCULAR; INTRAVENOUS; SOFT TISSUE PRN
Status: DISCONTINUED | OUTPATIENT
Start: 2025-06-24 | End: 2025-06-24 | Stop reason: SURG

## 2025-06-24 RX ORDER — ISOSULFAN BLUE 50 MG/5ML
INJECTION, SOLUTION SUBCUTANEOUS
Status: DISCONTINUED
Start: 2025-06-24 | End: 2025-06-24 | Stop reason: HOSPADM

## 2025-06-24 RX ORDER — HALOPERIDOL 5 MG/ML
1 INJECTION INTRAMUSCULAR
Status: DISCONTINUED | OUTPATIENT
Start: 2025-06-24 | End: 2025-06-24 | Stop reason: HOSPADM

## 2025-06-24 RX ORDER — HYDROMORPHONE HYDROCHLORIDE 1 MG/ML
0.2 INJECTION, SOLUTION INTRAMUSCULAR; INTRAVENOUS; SUBCUTANEOUS
Status: DISCONTINUED | OUTPATIENT
Start: 2025-06-24 | End: 2025-06-24 | Stop reason: HOSPADM

## 2025-06-24 RX ORDER — HYDROMORPHONE HYDROCHLORIDE 1 MG/ML
0.1 INJECTION, SOLUTION INTRAMUSCULAR; INTRAVENOUS; SUBCUTANEOUS
Status: DISCONTINUED | OUTPATIENT
Start: 2025-06-24 | End: 2025-06-24 | Stop reason: HOSPADM

## 2025-06-24 RX ORDER — CEFAZOLIN SODIUM 1 G/3ML
INJECTION, POWDER, FOR SOLUTION INTRAMUSCULAR; INTRAVENOUS PRN
Status: DISCONTINUED | OUTPATIENT
Start: 2025-06-24 | End: 2025-06-24 | Stop reason: SURG

## 2025-06-24 RX ADMIN — SODIUM CHLORIDE, POTASSIUM CHLORIDE, SODIUM LACTATE AND CALCIUM CHLORIDE: 600; 310; 30; 20 INJECTION, SOLUTION INTRAVENOUS at 15:11

## 2025-06-24 RX ADMIN — FENTANYL CITRATE 100 MCG: 50 INJECTION, SOLUTION INTRAMUSCULAR; INTRAVENOUS at 16:26

## 2025-06-24 RX ADMIN — DEXAMETHASONE SODIUM PHOSPHATE 4 MG: 4 INJECTION INTRA-ARTICULAR; INTRALESIONAL; INTRAMUSCULAR; INTRAVENOUS; SOFT TISSUE at 15:25

## 2025-06-24 RX ADMIN — OXYCODONE HYDROCHLORIDE 5 MG: 5 SOLUTION ORAL at 18:01

## 2025-06-24 RX ADMIN — PROPOFOL 200 MG: 10 INJECTION, EMULSION INTRAVENOUS at 15:15

## 2025-06-24 RX ADMIN — CEFAZOLIN 2 G: 1 INJECTION, POWDER, FOR SOLUTION INTRAMUSCULAR; INTRAVENOUS at 15:22

## 2025-06-24 RX ADMIN — MIDAZOLAM HYDROCHLORIDE 2 MG: 1 INJECTION, SOLUTION INTRAMUSCULAR; INTRAVENOUS at 15:11

## 2025-06-24 RX ADMIN — FENTANYL CITRATE 50 MCG: 50 INJECTION, SOLUTION INTRAMUSCULAR; INTRAVENOUS at 18:39

## 2025-06-24 RX ADMIN — ONDANSETRON 4 MG: 2 INJECTION INTRAMUSCULAR; INTRAVENOUS at 18:58

## 2025-06-24 RX ADMIN — FENTANYL CITRATE 150 MCG: 50 INJECTION, SOLUTION INTRAMUSCULAR; INTRAVENOUS at 15:25

## 2025-06-24 RX ADMIN — LIDOCAINE HYDROCHLORIDE 100 MG: 20 INJECTION, SOLUTION EPIDURAL; INFILTRATION; INTRACAUDAL; PERINEURAL at 15:15

## 2025-06-24 RX ADMIN — ONDANSETRON 4 MG: 2 INJECTION INTRAMUSCULAR; INTRAVENOUS at 15:25

## 2025-06-24 ASSESSMENT — PAIN DESCRIPTION - PAIN TYPE
TYPE: SURGICAL PAIN

## 2025-06-24 ASSESSMENT — FIBROSIS 4 INDEX: FIB4 SCORE: 0.39

## 2025-06-24 NOTE — PROGRESS NOTES
"On June 24, 2025, , Rosie Aguilar, received an email from pt and TAYLOR Aguilar responded.     \"Good morning Olinda,     I am so sorry to hear about your car. I will see if the [facility] has any more bus passes. We do not have any bus passes here at [facility].     Since you have Medicaid, you do qualify for Medical Transportation Management (MTM) benefits and you can get door to door  and drop off to your appointments by MTM.     In order to start this process you can call the [facility] # at [phone]. You can get 30 days of rides to and from your appointments if you let them know you are a cancer patient and going to your cancer appointments. They will book the rides for you. Within that 30 day window, you and the oncology team here need to fill out an RTC Access packet of information that verifies your cancer diagnosis and a provider will have to sign off saying that your cancer diagnosis is your disability (sometimes there is pushback from this, but we can help navigate this). If your application is approved, you will be able to continue that resource of gettign door to door transportation to your appointments throughout your treatment.     Please send me any applications you have and I will get them to the right people. Please let me know if we need to print anything for you to have at your next appointment or mail to you.     Best of luck in your surgery today and wishing you a speady recovery.    Rosie Aguilar, AMYW, MSW  Outpatient Oncology Social Worker  [facility] [place]  [address]  Mailstop: [address]  P: [phone]  F: [phone]  [email or web address]  Read Our Annual Report, Growing With Our Community    [facility]  Twitter  Facebook  Instagram  Pinterest  YouTube  LnkedIn [email or web address] [email or web address]    Rosie Aguilar  You don't often get email from [email or web address]. Learn why this is important  CAUTION: External Email. THINK BEFORE YOU " "CLICK.        Salo kirby it's Olinda sawyer I'm having surgery today but I wanted to ask you so my vehicle the starter went out of course yesterday I do have a ride to and from surgery however I what I'm worried about is my appointments afterwards and because they do have a few of them and I'm not sure when I'll be able to get that vehicle fixed because I'm not working so I was wondering is there any way that I'd be able to get a bus pass can you guys help with those and then I do have some of those finding applications filled out my question is can I email them to you and have you send them to where they need to go only because I'm kind of confused about it I do have a few more details to fill in on them but I could send them over after I think there may be one that needed to be printed out and mailed which also I don't have access to a printer. Please let me know thanks so much.\"        "

## 2025-06-24 NOTE — ANESTHESIA PREPROCEDURE EVALUATION
Case: [id number] Date/Time: 06/24/25 1500    Procedures:       LEFT TOTAL MASTECTOMY, LEFT SENTINEL LYMPH NODE MAGNETIC DETECTION (Left: Breast)      IDENTIFICATION, LYMPH NODE, SENTINEL, INTRAOPERATIVE, USING DYE INJECTION (Left: Breast)    Anesthesia type: General    Pre-op diagnosis: LEFT DCIS    Location: [facility] ROOM 28 / SURGERY SAME DAY [facility]    Surgeons: Lovely Herr M.D.            Relevant Problems      (positive) Hepatitis C-no treatment, dx 1999, IV drug hx.       Physical Exam    Airway   Mallampati: II  TM distance: >3 FB  Neck ROM: full       Cardiovascular - normal exam  Rhythm: regular  Rate: normal    (-) murmur     Dental - normal exam           Pulmonary - normal examBreath sounds clear to auscultation     Abdominal    Neurological - normal exam                   Anesthesia Plan    ASA 2       Plan - general       Airway plan will be LMA          Induction: intravenous    Postoperative Plan: Postoperative administration of opioids is intended.    Pertinent diagnostic labs and testing reviewed    Informed Consent:    Anesthetic plan and risks discussed with patient.    Use of blood products discussed with: patient whom consented to blood products.

## 2025-06-24 NOTE — PROGRESS NOTES
Oncology Nurse Navigation  [facility] Medical Information form completed and sent to EDWARDO Aguilar for submission.  Document scanned to Media.

## 2025-06-24 NOTE — ANESTHESIA PROCEDURE NOTES
Airway    Date/Time: 6/24/2025 3:23 PM    Performed by: Fab Linares M.D.  Authorized by: Fab Linares M.D.    Location:  OR  Urgency:  Elective  Indications for Airway Management:  Anesthesia      Spontaneous Ventilation: absent    Sedation Level:  Deep  Preoxygenated: Yes    Final Airway Type:  Supraglottic airway  Final Supraglottic Airway:  Standard LMA    SGA Size:  3  Number of Attempts at Approach:  1

## 2025-06-25 ENCOUNTER — PATIENT OUTREACH (OUTPATIENT)
Dept: ONCOLOGY | Facility: MEDICAL CENTER | Age: 45
End: 2025-06-25
Payer: MEDICAID

## 2025-06-25 ENCOUNTER — TELEPHONE (OUTPATIENT)
Age: 45
End: 2025-06-25
Payer: MEDICAID

## 2025-06-25 LAB — PATHOLOGY CONSULT NOTE: NORMAL

## 2025-06-25 NOTE — ANESTHESIA TIME REPORT
Anesthesia Start and Stop Event Times       Date Time Event    6/24/2025 1511 Anesthesia Start     1522 Ready for Procedure     1741 Anesthesia Stop          Responsible Staff  06/24/25      Name Role Begin End    Fab Linares M.D. Anesth 1511 1741          Overtime Reason:  no overtime (within assigned shift)    Comments:

## 2025-06-25 NOTE — TELEPHONE ENCOUNTER
Left voicemail as a courtesy call to see how patient is doing after surgery. If doing well, pain well controlled and no questions or concerns, no need to call back. Reminded of follow up appointment 07/02/2025 at 2:20pm, check in 2pm. Encouraged to call back with any questions or concerns.

## 2025-06-25 NOTE — OP REPORT
Operative Report    Date: 6/24/2025      Pre-operative Diagnosis: DCIS left breast    Post-operative Diagnosis: Same    Procedure:   Left total mastectomy  Left Magtrace injection for sentinel node mapping    Surgeon: Lovely Herr M.D.    Assistant: GAUDENCIO Gill    Anesthesiologist: Smitha Linares MD    Anesthesia:  General.  Multimodal analgesics administered as appropriate.      Pre-Op Meds:  Ancef    ASA class:  2    Indications:   This is a 45 year old female with left breast DCIS.  After discussing risks and benefits of her options, she has elected to proceed with total mastectomy with Magtrace for sentinel node mapping, and feels well prepared for expectations postoperatively.  She is in the process of quitting smoking tobacco, and is hoping for delayed reconstruction after complete cessation.      Findings:   Both biopsy clips present in specimen.  Additional anterior tissue excised from the superior flap, representing additional tissue in the upper outer breast.  White sutures smitha the anterior margin, and black S-S, L-L.  Sentimag counts to 200's.      Summary:   The patient underwent radiotracer injection by Radiology, then was taken to the operating room.  She was anesthetized, prepped, and draped in sterile fashion.  Time out was confirmed.  I injected 2mL of Magtrace solution into the subareolar breast, then vigorous lymphatic massage was performed as per protocol, for 5 minutes.      I excised a large ellipse of skin, including the NAC, to minimize redundant skin.  Dissection was carried along the anterior mammary fascial plane to the infraclavicular region, the sternum, the latissimus dorsi, and the inframammary fold.  Fascia was included in the specimen and usual orienting sutures were placed.  Specimen mammogram was performed  to assist Pathology and verify area of interest was excised.  I noted that the superior skin flap was thicker than anticipated in the central lateral aspect, so  additional tissue was resected representing the new anterior margin in the upper outer breast.  No  vessels were disturbed with this additional resection.  Specimen mammogram was again performed.  I irrigated and ensured hemostasis.      We then removed all magnetic instruments and Sentimag counts were confirmed to consistently be in the 200 range, demonstrating successful lymphatic mapping to the axilla.      Intercostal blocks were placed and a 15fr drain was inserted, secured with nylon.  As reconstruction is expected, and to avoid worsening blood supply to her flaps in the setting of recent nicotine use, we elected not to perform any adjacent tissue transfers.   3-0 vicryls were used to close the deep dermal layer and 4-0 monocryl was used for skin.  Prevena dressing was placed and I was informed all counts were correct.   Assistance was required for retraction and help closing.         [place] Node Biopsy for Breast Cancer  Operation performed with curative intent Yes   Tracer(s) used to identify sentinel nodes in the upfront surgery (non-neoadjuvant) setting Superparamagnetic iron oxide   Tracer(s) used to identify sentinel nodes in the neoadjuvant setting N/A   All nodes (colored or non-colored) present at the end of a dye-filled lymphatic channel were removed N/A   All significantly radioactive nodes were removed N/A   All palpably suspicious nodes were removed N/A   Biopsy-proven positive nodes marked with clips prior to chemotherapy were identified and removed N/A

## 2025-06-25 NOTE — DISCHARGE INSTRUCTIONS
HOME CARE INSTRUCTIONS    ACTIVITY: Rest and take it easy for the first 24 hours.  A responsible adult is recommended to remain with you during that time.  It is normal to feel sleepy.  We encourage you to not do anything that requires balance, judgment or coordination.    FOR 24 HOURS DO NOT:  Drive, operate machinery or run household appliances.  Drink beer or alcoholic beverages.  Make important decisions or sign legal documents.    SPECIAL INSTRUCTIONS: Please empty and record KARINA drain output at least 3 times daily, may need to empty more frequently if drain becomes full.   Please bring output sheet to follow-up appointment.    Walk regularly starting tomorrow.  Avoid strenuous arm activity and abrupt arm movements; limit to necessary activities of daily living.    Pt has written post-op instructions from the office.    Wear binder for compression 24/7 except when showering.  T-shirt may be worn between bra and binder to avoid skin irritation.    May shower in 24h when steady on your feet. Please leave dressings in place until follow up appointment.     DIET: To avoid nausea, slowly advance diet as tolerated, avoiding spicy or greasy foods for the first day.  Add more substantial food to your diet according to your physician's instructions.  Babies can be fed formula or breast milk as soon as they are hungry.  INCREASE FLUIDS AND FIBER TO AVOID CONSTIPATION.    MEDICATIONS: Resume taking daily medication.  Take prescribed pain medication with food.  If no medication is prescribed, you may take non-aspirin pain medication if needed.  PAIN MEDICATION CAN BE VERY CONSTIPATING.  Take a stool softener or laxative such as senokot, pericolace, or milk of magnesia if needed.    Last pain medication given: Oxycodone given at 6 pm.     A follow-up appointment should be arranged with your doctor; call to schedule.    You should CALL YOUR PHYSICIAN if you develop:  Fever greater than 101 degrees F.  Pain not relieved by  medication, or persistent nausea or vomiting.  Excessive bleeding (blood soaking through dressing) or unexpected drainage from the wound.  Extreme redness or swelling around the incision site, drainage of pus or foul smelling drainage.  Inability to urinate or empty your bladder within 8 hours.  Problems with breathing or chest pain.    You should call 911 if you develop problems with breathing or chest pain.  If you are unable to contact your doctor or surgical center, you should go to the nearest emergency room or urgent care center.  Physician's telephone #: Dr. Herr [phone]    MILD FLU-LIKE SYMPTOMS ARE NORMAL.  YOU MAY EXPERIENCE GENERALIZED MUSCLE ACHES, THROAT IRRITATION, HEADACHE AND/OR SOME NAUSEA.    If any questions arise, call your doctor.  If your doctor is not available, please feel free to call the Surgical Center at [phone].    The Center is open Monday through Friday from 7AM to 7PM.      A registered nurse may call you a few days after your surgery to see how you are doing after your procedure.    You may also receive a survey in the mail within the next two weeks and we ask that you take a few moments to complete the survey and return it to us.  Our goal is to provide you with very good care and we value your comments.

## 2025-06-25 NOTE — ANESTHESIA POSTPROCEDURE EVALUATION
Patient: Talia Rose Salvatelli Moniz    Procedure Summary       Date: 06/24/25 Room / Location: [facility] ROOM 28 / SURGERY SAME DAY [facility]    Anesthesia Start: 1511 Anesthesia Stop: 1741    Procedures:       LEFT TOTAL MASTECTOMY, LEFT SENTINEL LYMPH NODE MAGNETIC DETECTION (Left: Breast)      IDENTIFICATION, LYMPH NODE, SENTINEL, INTRAOPERATIVE, USING DYE INJECTION (Left: Breast) Diagnosis: (LEFT DCIS)    Surgeons: Lovely Herr M.D. Responsible Provider: Fab Linares M.D.    Anesthesia Type: general ASA Status: 2            Final Anesthesia Type: general  Last vitals  BP   Blood Pressure: 133/86    Temp   (!) 35.7 °C (96.2 °F)    Pulse   93   Resp   18    SpO2   99 %      Anesthesia Post Evaluation    Patient location during evaluation: PACU  Patient participation: complete - patient participated  Level of consciousness: awake and alert    Airway patency: patent  Anesthetic complications: no  Cardiovascular status: hemodynamically stable  Respiratory status: acceptable  Hydration status: euvolemic    PONV: none          No notable events documented.     Nurse Pain Score: 0 (NPRS)

## 2025-06-25 NOTE — OR NURSING
1742 - Pt to PACU 7 from OR. Bedside report from anesthesiologist and RN. Attached to monitoring, VSS, breathing is calm and unlabored. 4L mask, LMA in place. No signs pain or nausea. Dressings in place with KARINA and Prevena also in place.  Dressings all clean / dry / intact.     1745 - LMA removed. Pt now on room air, no signs pain or nausea.     1804 - Ride notified about pt status and plan of care.     1815 - Family brought bedside. Room air, VSS, pt had water and snack.     1900 - Pt medicated for reported pain and nausea. Able to get dressed without issue.     1915 - Pt stable to discharge. Instructions given, patient and family verbalize understanding. Unable to print AVS on any printers on unit so discussed them looking up AVS handout through [employer], and filled out generic anesthesia instructions by hand. IV And armbands removed. Taken via wheelchair to car. Pt has all belongings with them. Given demonstration how to empty KARINA drain and record output as well as troubleshoot Prevena alarms via the packet.

## 2025-06-26 NOTE — PROGRESS NOTES
On June 25, 2025, , Rosie Aguilar, mailed out pt.'s application for assistance the [employer] [employer] and [employer].

## 2025-06-27 ENCOUNTER — PATIENT OUTREACH (OUTPATIENT)
Dept: ONCOLOGY | Facility: MEDICAL CENTER | Age: 45
End: 2025-06-27
Payer: MEDICAID

## 2025-06-27 NOTE — PROGRESS NOTES
"On June 27, 2025, , Rosie Aguilar, communicated with pt. Via email regarding pt.'s request for assistance with drinks for her family.        \"I am glad to hear you are feeling better.     There are a few different options regarding food assistance.     [place] Summer EBT Program  You can apply at the online portal here https://access[place].gov/public[place]sebt-benefits/start to see you your kids qualify to receive $120 per child in food assistance (may be able to purchase drinks)    Food market through the [employer]'s [employer]  https://www.ClickFacts.org/who-we-serve/d-bu-h-parent/food-market/?gad_source=1&gad_campaignid=71968016657&gbraid=0AAAAACr-tjvnImtZFDruxgOsMJLzO5sG5&bcigi=Cj0KCQjwgvnCBhCqARIsADBLZoJkg555Y3Pke2xjbHTCCOxBBAqnutDYLVbBaSoIvFJwesTFXOIWFYsaAt1EEALw_wcB    3.  Use the [employer] Food Prescription Pantry Program  You can take the attached form that is signed to a qualifying food bank to go and receive additional healthy food resources.     Please let me know if you have any questions about these programs.      Rosie Aguilar LCSW, MSW  Outpatient Oncology Social Worker  [address] [place] [address]  [address]  Mailstop: L-11  P: [phone]  F: [phone]  [email or web address]  Read Our Annual Report, Growing With Our [facility]        Talia Salvatelli [email or web address]    Rosie Aguilar  CAUTION: External Email. THINK BEFORE YOU CLICK.        Feeling much better now actually. Thank you for asking. My kids are 14, 13, and 11 plus I also have a 21 yr old son  ?  Jaclyn Silva Talia Salvatelli moniz <[email or web address]>  Good morning Olinda,     How are you feeling after your surgery?    I don't know of any specific resources for drinks, but there are food assitance programs availalbe that may have drinks. Can you remind me the ages of your chlidren so I make sure I send you the correct resources for their age groups?    ?  Rosie Aguilar LCSW, MSW  Outpatient " "Oncology Social Worker  [address] [facility] [place]  [address]  Mailstop: L-11  P: [phone]  F: [phone]  [email or web address]  Read Our Annual Report, Growing With Our Community           From: Talia Salvatelli moniz <[email or web address]>  Sent: Friday, June 27, 2025 5:41 AM  To: Rosie Aguilar <[email or web address]>  Subject: Re: transportation and assistance apps     CAUTION: External Email. THINK BEFORE YOU CLICK.        Good morning! I'm not sure if this is something you guys can help with or not but I was wondering if there's any agencies that might help with drinks for us at home? We are literally out of anything to drink at home! It's me and 4 kids so it's hard to keep up on right now for me. If you could lmk id appreciate it??. Thank you so much. \"    "

## 2025-07-02 ENCOUNTER — APPOINTMENT (OUTPATIENT)
Age: 45
End: 2025-07-02
Payer: MEDICAID

## 2025-07-02 VITALS
TEMPERATURE: 98.6 F | BODY MASS INDEX: 29.26 KG/M2 | HEIGHT: 62 IN | SYSTOLIC BLOOD PRESSURE: 149 MMHG | DIASTOLIC BLOOD PRESSURE: 82 MMHG | HEART RATE: 91 BPM | WEIGHT: 159 LBS | OXYGEN SATURATION: 99 %

## 2025-07-02 DIAGNOSIS — D05.12 DUCTAL CARCINOMA IN SITU (DCIS) OF LEFT BREAST: Primary | ICD-10-CM

## 2025-07-02 PROCEDURE — 99024 POSTOP FOLLOW-UP VISIT: CPT | Performed by: SURGERY

## 2025-07-02 PROCEDURE — 3079F DIAST BP 80-89 MM HG: CPT | Performed by: SURGERY

## 2025-07-02 PROCEDURE — 3077F SYST BP >= 140 MM HG: CPT | Performed by: SURGERY

## 2025-07-02 ASSESSMENT — FIBROSIS 4 INDEX: FIB4 SCORE: 0.48

## 2025-07-03 NOTE — PROGRESS NOTES
7/2/2025  2:05 PM    Imaging facility:  [facility]  [place]  [place]   Gynecology:  JAYSON Rivera  Medical oncologist:  Dr. Bacilio Bennett 6/24/25   Oncology wellness:  Dr. An Ramos  Cardiologist:  Dr. Ruy Milan 6/19/25    CC:   Chief Complaint   Patient presents with    Post-op        Interval history:  Olinda returns for routine postoperative appointment.  She had minimal nerve like pain after surgery and only mild nausea.  Bowel habits are back to normal.  She feels well overall.  Drain output is borderline for removal.      6/18/25:  Olinda returns to discuss and finalize her surgery plan.  She desires total mastectomy and has been reading up on this.  She has seen pictures of total mastectomies without reconstruction to prepare herself better.  Her fiance's sister in law also underwent similar breast surgery and has been very helpful to her.  She signed up for the smoking cessation class which starts July 2, and is highly motivated to quit smoking.      Initial presentation:   6/2/25:  This friendly 44 y.o. year old self-identified  female is kindly referred for virtual consultation regarding left breast cancer. She denies any palpable lumps, skin changes, or nipple discharge.  No prior breast biopsies or breast surgery.      LABS:  Lab Results   Component Value Date/Time    HBA1C 5.1 05/07/2025 04:34 PM        Problem List[1]    Past Surgical History[2]    Allergies[3]    Current Outpatient Medications   Medication Sig    ferrous sulfate 325 (65 Fe) MG EC tablet Take 325 mg by mouth 2 times a day.    clarithromycin (BIAXIN) 125 MG/5ML Recon Susp Take 125 mg by mouth 2 times a day.    metroNIDAZOLE (FLAGYL) 250 MG Tab Take 250 mg by mouth every 8 hours.       Social History[4]     Social Hx - Family and Occupational[5]    Family History   Problem Relation Age of Onset    Psychiatric Illness Mother         Bipolar    Lung Disease Mother     Cancer  "Father         Liver & Lung    Lung Disease Father         Also liver cancer and hepatitis c    Cancer Maternal Grandmother         lung    Psychiatric Illness Maternal Grandfather         Schizophrenia    Lung Disease Maternal Aunt        OB History    Para Term  AB Living   8 4 3 0 3 3   SAB IAB Ectopic Molar Multiple Live Births   1 1 1 0 0 3   Obstetric Comments   Age of first delivery: 23   Menarche: 9   LMP: 2025   No HRT       Physical Exam:  BP (!) 149/82 (BP Location: Right arm, Patient Position: Sitting, BP Cuff Size: Large adult)   Pulse 91   Temp 37 °C (98.6 °F) (Temporal)   Ht 1.575 m (5' 2\")   Wt 72.1 kg (159 lb)   LMP 2025 (Approximate)   SpO2 99%   BMI 29.08 kg/m²     General: Pleasant demeanor.  Appears well, no acute distress.  Surgical site:  Left horizontal incision healing very well.  Skin redundancy present, intentional, to avoid tension on the healing wound (tobacco use) which she recalls discussing.  No infection, seroma, or hematoma. See scanned diagram    1. Ductal carcinoma in situ (DCIS) of left breast            ASSESSMENT:  Multifocal LEFT breast HG DCIS.  Pathologic Stage 0 (pTis pNx/cN0).  Stereotactic biopsies 25, [facility]. LEFT TM and Magtrace 25.      LEFT anterior superior upper outer HG DCIS with calcs and necrosis. Clinical Stage 0 (cTis cN0 M0).  Total area spanned by both groupings is over 4cm.                Residual 1.2cm HG DCIS.  MARGINS CLEAR.  [2.6cm calcs.  Tumark Q clip. 1.5cm posterior lateral displacement.]              ER %, MA 31-40%     LEFT posterior inferior/central HG DCIS with calcs and necrosis. Clinical Stage 0 (cTis cN0 M0).  Total area spanned by both groupings is over 4cm.                No residual DCIS in TM.  [1cm calcs.  Tumark X clip. Medial 1cm displacement.]               ER %, MA negative (<1%).     Axillary surgery not indicated.  Magtrace injected 25, counts to 200's.       Last bilateral " mammogram 3/12/2025 [facility]: Heterogeneously dense. Last prior mammo 2018     FH:  Father with liver (primary) and lung cancer (55), maternal grandmother with lung cancer (68), mom with lung cancer (71), paternal grandfather with tongue cancer - unknown age.               Referred to Genetics ([facility], 25).  Saliva test sent, results pending       Menopausal/HRT status:    Age of first delivery: 23  Menarche: 9  LMP: 2025  No HRT     LIFESTYLE:  Currently smokes 1/2 PPD x32 years. Smoking cessation class starts 25.  No vaping or marijuana use. Former history of meth use for 13 years, reports cessation in . Previous alcohol use, for last year and a half would drink 2-4 buzzballs daily. Quit drinking 5/15/25. BMI 30.      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      Personal history of leukemia at age 5, unknown treatment.      Hepatitis C, completed treatment and last blood work reported as undetectable.      Currently being treated with antibiotics for H. Pylori and BV.      ROS with complaints of chest pain, shortness of breath, palpitations, and lower extremity edema. Has PFT scheduled 25. Cardiology clearance 25.       ORT score 10 (HR).  No pain med agreement.      DISCUSSED/PLAN:  We discussed the pathology report including final staging information, and a printed copy was offered.  She had to reschedule her appointment with Dr. Bennett, but may not require adjuvant therapy now that DCIS has been confirmed following total mastectomy.  Neither is there any role for radiation therapy.      We reviewed the possibility of recurrent seroma and the need to continue avoiding arm activity.  She prefers to keep the drains in place over the holiday weekend, stating that she does not trust herself to sufficiently avoid overdoing things.  I asked her to call us  Monday with the drain report and hopefully it can be removed then.  After the drain is out, she can begin gentle walking-up-the-wall stretches for ROM, but I advised her to avoid other arm activity until the next appointment, to avoid seroma formation that could require needle aspiration.  Compression also remains important.      We gave her some items thoughtfully created by another breast cancer patient to assist with her recovery.  I also advised her to continue walking regularly.  We will see her back in 3 weeks for seroma check and encouraged her to call sooner with questions or concerns.          [1]   Patient Active Problem List  Diagnosis    Hepatitis C-no treatment, dx 1999, IV drug hx.    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    Hx of ectopic pregnancy - 1999    H/O transfusion - 1999    Carpal tunnel syndrome of right wrist    Ganglion cyst of finger of right hand    Ductal carcinoma in situ (DCIS) of left breast   [2]   Past Surgical History:  Procedure Laterality Date    MASTECTOMY Left 6/24/2025    Procedure: LEFT TOTAL MASTECTOMY, LEFT SENTINEL LYMPH NODE MAGNETIC DETECTION;  Surgeon: Lovely Herr M.D.;  Location: SURGERY SAME DAY [facility];  Service: General    IDENTIFICATION, LYMPH NODE, SENTINEL, INTRAOPERATIVE, USING DYE INJECTION Left 6/24/2025    Procedure: IDENTIFICATION, LYMPH NODE, SENTINEL, INTRAOPERATIVE, USING DYE INJECTION;  Surgeon: Lovely Herr M.D.;  Location: SURGERY SAME DAY [facility];  Service: General    PELVISCOPY  01/28/2014    Performed by Katrin Castro M.D. at SURGERY [facility]    ABDOMINAL EXPLORATION      eptopic oreg 1999    APPENDECTOMY      OTHER ABDOMINAL SURGERY      etopic preg 1999    TUBAL COAGULATION LAPAROSCOPIC BILATERAL     [3] No Known Allergies  [4]   Social History  Tobacco Use    Smoking status: Every Day     Current packs/day: 1.00     Average packs/day: 1 pack/day for 19.0 years  (19.0 ttl pk-yrs)     Types: Cigarettes    Smokeless tobacco: Never   Vaping Use    Vaping status: Never Used   Substance Use Topics    Alcohol use: Not Currently     Alcohol/week: 3.6 oz     Types: 6 Standard drinks or equivalent per week     Comment: rare    Drug use: Not Currently     Types: IV, Methamphetamines, Cocaine, Intravenous, Oral     Comment: meth 13 years, Quit 3 yrs ago   [5]   Family and Occupational History  Socioeconomic History    Marital status: Single     Spouse name: Not on file    Number of children: Not on file    Years of education: Not on file    Highest education level: GED or equivalent   Occupational History    Not on file

## 2025-07-08 ENCOUNTER — PATIENT OUTREACH (OUTPATIENT)
Dept: ONCOLOGY | Facility: MEDICAL CENTER | Age: 45
End: 2025-07-08
Payer: MEDICAID

## 2025-07-08 NOTE — PROGRESS NOTES
"On July 8, 2025, , Rosie Aguilar and pt. emailed regarding bus passes and the [employer] breast fund.       \"Thanks??  On Tue, Jul 8, 2025, 11:25?AM Rosie Aguilar <[email or web address]> wrote:  Salo Prakash,     Oh no I am so sorry of the troubles. I do not process the checks, it goes to our foundation, which processes them out of state. If they are able to initiate another check, it would take another month.    I was able to get a bus pass for you that is for one week for RTC. That was all that was given to me and we have already requested more so hopefully we will get a month pass. I will leave the 7 day pass in an envelope for you at the check in desk for medical oncology for your appointment tomorrow.     Rosie Aguilar LCSW, MSW  Outpatient Oncology Social Worker  [address] [place] [address]  [address]  Mailstop: L-11  P: [phone]  F: [phone]  [email or web address]  Read Our Annual Report, Growing With Our Community        From: Talia Salvatelli moniz <[email or web address]  Sent: Monday, July 7, 2025 10:56 AM  To: Rosie Aguilar <[email or web address]>  Subject: Re: [place] breast fund check     CAUTION: External Email. THINK BEFORE YOU CLICK.        Good morning! So I did receive the check for $1000.00 but I've ran into a problem. I signed it over to a friend to deposit into her account in order to get the money in time to bring towards my rent and the bank denied it for being 3rd party check and now the bank has mailed it to her address on her account. Which means it's now going to be at least 4 days or more??. Is there any way I could  abother check and stop payments on that one? Please lmk. Thanks!\"  "

## 2025-07-09 ENCOUNTER — TELEPHONE (OUTPATIENT)
Facility: MEDICAL CENTER | Age: 45
End: 2025-07-09
Payer: MEDICAID

## 2025-07-09 NOTE — TELEPHONE ENCOUNTER
M for patient to return my call at medical oncology. Patient missed her new patient appointment with Dr. Bennett today, 7/9/25 at 09:00. I was calling to see if she would like to reschedule. Office phone number provided for call back.

## 2025-07-10 ENCOUNTER — PATIENT OUTREACH (OUTPATIENT)
Dept: ONCOLOGY | Facility: MEDICAL CENTER | Age: 45
End: 2025-07-10
Payer: MEDICAID

## 2025-07-11 ENCOUNTER — PATIENT OUTREACH (OUTPATIENT)
Dept: ONCOLOGY | Facility: MEDICAL CENTER | Age: 45
End: 2025-07-11
Payer: MEDICAID

## 2025-07-11 ENCOUNTER — TELEPHONE (OUTPATIENT)
Age: 45
End: 2025-07-11
Payer: MEDICAID

## 2025-07-11 NOTE — TELEPHONE ENCOUNTER
Left vm for patient regarding her drain output and what her totals have been for the past couple of days. On 7/08 she reported that her totals for the past 3 days had been 50ml, 25ml, & 25ml. Asked patient to call back to discuss totals and arrange to remove the drains if they were within the required amount.

## 2025-07-11 NOTE — PROGRESS NOTES
On July 11, 2025, , Rosie Aguilar, faxed over pt.'s application to Kaitlynn Jeffers for financial assistance.

## 2025-07-14 NOTE — PROGRESS NOTES
On July 10, 2025, , Rosie Aguilar, mailed pt. a pack of 7 bus passes to help pt. get to her appointments.

## 2025-07-16 ENCOUNTER — PATIENT OUTREACH (OUTPATIENT)
Dept: ONCOLOGY | Facility: MEDICAL CENTER | Age: 45
End: 2025-07-16

## 2025-07-16 ENCOUNTER — OFFICE VISIT (OUTPATIENT)
Age: 45
End: 2025-07-16
Payer: MEDICAID

## 2025-07-16 VITALS
SYSTOLIC BLOOD PRESSURE: 132 MMHG | HEIGHT: 62 IN | HEART RATE: 79 BPM | WEIGHT: 161 LBS | DIASTOLIC BLOOD PRESSURE: 87 MMHG | OXYGEN SATURATION: 100 % | TEMPERATURE: 97.3 F | BODY MASS INDEX: 29.63 KG/M2

## 2025-07-16 DIAGNOSIS — D05.12 DUCTAL CARCINOMA IN SITU (DCIS) OF LEFT BREAST: Primary | ICD-10-CM

## 2025-07-16 PROCEDURE — 99024 POSTOP FOLLOW-UP VISIT: CPT

## 2025-07-16 PROCEDURE — 3079F DIAST BP 80-89 MM HG: CPT

## 2025-07-16 PROCEDURE — 3075F SYST BP GE 130 - 139MM HG: CPT

## 2025-07-16 ASSESSMENT — FIBROSIS 4 INDEX: FIB4 SCORE: 0.48

## 2025-07-16 NOTE — PROGRESS NOTES
"On July 16, 2025, pt. Sent TAYLOR Aguilar an email requesting that TAYLOR Aguilar fill out an application to the [employer] for her. TAYLOR Aguilar informed pt. that she would need to be in active treatment to be considered for the samaria and pt. has not yet met with a medical oncologist to determine if pt. will need further treatment.     \"Okay great! Good news about the truck!!    Unfortunately our gas card program is on hold at the moment due to fraud with the cards on [employer]'s end. I will let you know when there is any movement if there are more cards available.     It looks like you have Medicaid so you should be able to get your gas mileage reimbursed through that program. Here is the information on that.    [email or web address]  Rosie Aguilar LCSW, MSW  Outpatient Oncology Social Worker  [address] [place] [address]  [address]  Mailstop: L-11  P: [phone]  F: [phone]  [email or web address]  Read Our Annual Report, Growing With Our Community      Talia Salvatelli [email or web address]>    Rosie Aguilar  CAUTION: External Email. THINK BEFORE YOU CLICK.        Ok I'll let you know. I'm at my appointment at Dr Herr's rn. My friend fixed the truck!! Is there any way to get another gas card by chance or?? And I haven't got to the mail to see about bus pass yet just letting you know but as soon as I do, I'll let you know if it came. I'm sure it did though.  Confidentiality Notice: This e-mail message and any attachments are for the sole use of the intended recipient and may contain proprietary, confidential, trade secret or privileged information. Any unauthorized review, use, disclosure, or distribution is prohibited and may be a violation of law. If you are not the intended recipient or a person responsible for delivering this message to an intended recipient, please contact the sender by reply e-mail and destroy all copies of the original message immediately.    ?  Rosie Prakash " "Salvatelli moniz <[email or web address]>  Good morning Olinda,     This samaria is for patients that are in active treatment. Since you have not been able to see Dr. Bennett yet to see if you need adjuvant therapy (which would put you in active treatment), they may not accept your application. I am happy to submit it, but you may want to wait until you can meet with Dr. Bennett to get your plan of care.     Additionally, you will need to provide an invoice/ billing statement for the bill you wait paid. Their maximum they will pay is $500. See below.   \"A current and accurate billing statement/invoice that supports the month being requested for funding and the complete address to which payments are sent (Bill requested must be for the patient’s place of residence). Minimum Bill Amount $100. We will be funding ONE BILL ONLY so please be sure to submit a bill that is the closest to our maximum $500 allotment. If there is an additional name on the bill, please explain their relationship to the patient. *\"     Please let me know how you want me to proceed.     Jaclyn Talia Salvatelli [email or web address]<[email or web address]>    Rosie Aguilar  CAUTION: External Email. THINK BEFORE YOU CLICK.        Actually, I guess you've got a be the one to do the application . I don't . Thanks! Please lmk if and when you're able to do it??    On Tue, Jul 15, 2025, 6:26?PM Talia Salvatelli moniz <[email or web address]> wrote:  Talia Salvatelli moniz<[email or web address]>    Rosie Aguilar  CAUTION: External Email. THINK BEFORE YOU CLICK.        Can you please submit application to this program for me? It says social workers must be the ones to submit them. Lmk please and I'll get it filled out and send it to you. Thanks??\"  "

## 2025-07-16 NOTE — PROGRESS NOTES
7/16/2025  7:39 AM    Imaging facility:  [facility]  [place] care [place]  [place]AT[place] [place]   Gynecology:  JAYSON Rivera  Medical oncologist:  Dr. Bacilio Bennett 6/24/25   Oncology wellness:  Dr. An Ramos  Cardiologist:  Dr. Ruy Milan 6/19/25      CC:   Chief Complaint   Patient presents with    Post-op        Interval history:  Olinda returns with her daughter Zarina for routine postoperative appointment. She feels pain has been well controlled and feels some nerve pain. She reports that drain output would have been low enough last Friday to remove, but she didn't have transportation prior to today. Drain output has been around 20mL daily since the weekend.     7/2/25 (Dr. Herr): Olinda returns for routine postoperative appointment.  She had minimal nerve like pain after surgery and only mild nausea.  Bowel habits are back to normal.  She feels well overall.  Drain output is borderline for removal.       6/18/25 (Dr. Herr):  Olinda returns to discuss and finalize her surgery plan.  She desires total mastectomy and has been reading up on this.  She has seen pictures of total mastectomies without reconstruction to prepare herself better.  Her filars's sister in law also underwent similar breast surgery and has been very helpful to her.  She signed up for the smoking cessation class which starts July 2, and is highly motivated to quit smoking.       Initial presentation:   6/2/25 (YAMILETH Fuller, APRN):  This friendly 44 y.o. year old self-identified  female is kindly referred for virtual consultation regarding left breast cancer. She denies any palpable lumps, skin changes, or nipple discharge.  No prior breast biopsies or breast surgery.    Initial presentation:       LABS:  Lab Results   Component Value Date/Time    HBA1C 5.1 05/07/2025 04:34 PM        Problem List[1]    Past Surgical History[2]    Allergies[3]    Current Outpatient Medications   Medication Sig    ferrous sulfate 325  "(65 Fe) MG EC tablet Take 325 mg by mouth 2 times a day. (Patient not taking: Reported on 2025)    clarithromycin (BIAXIN) 125 MG/5ML Recon Susp Take 125 mg by mouth 2 times a day. (Patient not taking: Reported on 2025)    metroNIDAZOLE (FLAGYL) 250 MG Tab Take 250 mg by mouth every 8 hours. (Patient not taking: Reported on 2025)       Social History[4]     Social Hx - Family and Occupational[5]    Family History   Problem Relation Age of Onset    Psychiatric Illness Mother         Bipolar    Lung Disease Mother     Cancer Father         Liver & Lung    Lung Disease Father         Also liver cancer and hepatitis c    Cancer Maternal Grandmother         lung    Psychiatric Illness Maternal Grandfather         Schizophrenia    Lung Disease Maternal Aunt        OB History    Para Term  AB Living   8 4 3 0 3 3   SAB IAB Ectopic Molar Multiple Live Births   1 1 1 0 0 3   Obstetric Comments   Age of first delivery: 23   Menarche: 9   LMP: 2025   No HRT       Physical Exam:  /87 (BP Location: Right arm, Patient Position: Sitting, BP Cuff Size: Large adult)   Pulse 79   Temp 36.3 °C (97.3 °F) (Temporal)   Ht 1.575 m (5' 2\")   Wt 73 kg (161 lb)   LMP 2025 (Approximate)   SpO2 100%   BMI 29.45 kg/m²     General: Pleasant demeanor.  Appears well, no acute distress.  Surgical site:  LEFT horizontal incision is healing as expected with skin redundancy.  No infection, seroma or hematoma. Drain present with serous drainage.       1. Ductal carcinoma in situ (DCIS) of left breast            ASSESSMENT:  Multifocal LEFT breast HG DCIS.  Pathologic Stage 0 (pTis pNx/cN0).  Stereotactic biopsies 25, [facility]. LEFT TM and Magtrace 25.       LEFT anterior superior upper outer HG DCIS with calcs and necrosis. Clinical Stage 0 (cTis cN0 M0).  Total area spanned by both groupings is over 4cm.                Residual 1.2cm HG DCIS.  MARGINS CLEAR.  [2.6cm calcs.  Tumark Q clip. " 1.5cm posterior lateral displacement.]              ER %, HI 31-40%     LEFT posterior inferior/central HG DCIS with calcs and necrosis. Clinical Stage 0 (cTis cN0 M0).  Total area spanned by both groupings is over 4cm.                No residual DCIS in TM.  [1cm calcs.  Tumark X clip. Medial 1cm displacement.]               ER %, HI negative (<1%).                 Axillary surgery not indicated.  Magtrace injected 25, counts to 200's.       Last bilateral mamm[facility] 3/12/2025 [facility]: Heterogeneously dense. Last prior mammo 2018     FH:  Father with liver (primary) and lung cancer (55), maternal grandmother with lung cancer (68), mom with lung cancer (71), paternal grandfather with tongue cancer - unknown age.               Referred to Genetics (Si TV, 25).  Saliva test sent, negative multigene panel 25.        Menopausal/HRT status:    Age of first delivery: 23  Menarche: 9  LMP: 2025  No HRT     LIFESTYLE:  Currently smokes 1/2 PPD x32 years. Smoking cessation class starts 25.  No vaping or marijuana use. Former history of meth use for 13 years, reports cessation in 2020. Previous alcohol use, for last year and a half would drink 2-4 buzzballs daily. Quit drinking 5/15/25. BMI 30.      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      Personal history of leukemia at age 5, unknown treatment.      Hepatitis C, completed treatment and last blood work reported as undetectable.      Currently being treated with antibiotics for H. Pylori and BV.      ROS with complaints of chest pain, shortness of breath, palpitations, and lower extremity edema. Has PFT scheduled 25. Cardiology clearance 25.       ORT score 10 (HR).  No pain med agreement.      DISCUSSED/PLAN:  We discussed continued use of strict compression  for the next two weeks and to  monitor for signs of seroma with drain removal today. Normal dressing applied and advised she may remove it in 48-72 hours. We discussed the importance of compression and limitation of arm use to prevent development of seroma which could potentially require serial needle aspiration. She reports difficulty with limiting the use of her LEFT arm consistently. I have encouraged her to get a sling to remind her to restrict her arm use. I have assisted her in placing an ACE bandage with gauze fluff for compression along her mastectomy site. She has been instructed to avoid any LEFT arm stretching until she is assessed for seroma in two weeks. She has been advised to contact sooner if she begins to notice any accumulation of fluid or signs/symptoms of infection.     She reports needing to reschedule her appointment with Dr. Bennett and plans to do so after this appointment.            [1]   Patient Active Problem List  Diagnosis    Hepatitis C-no treatment, dx 1999, IV drug hx.    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    Hx of ectopic pregnancy - 1999    H/O transfusion - 1999    Carpal tunnel syndrome of right wrist    Ganglion cyst of finger of right hand    Ductal carcinoma in situ (DCIS) of left breast   [2]   Past Surgical History:  Procedure Laterality Date    MASTECTOMY Left 6/24/2025    Procedure: LEFT TOTAL MASTECTOMY, LEFT SENTINEL LYMPH NODE MAGNETIC DETECTION;  Surgeon: Lovely Herr M.D.;  Location: SURGERY SAME DAY [facility];  Service: General    IDENTIFICATION, LYMPH NODE, SENTINEL, INTRAOPERATIVE, USING DYE INJECTION Left 6/24/2025    Procedure: IDENTIFICATION, LYMPH NODE, SENTINEL, INTRAOPERATIVE, USING DYE INJECTION;  Surgeon: Lovely Herr M.D.;  Location: SURGERY SAME DAY [facility];  Service: General    PELVISCOPY  01/28/2014    Performed by Katrin Castro M.D. at SURGERY [facility]    ABDOMINAL EXPLORATION      eptopic  oreg 1999    APPENDECTOMY      OTHER ABDOMINAL SURGERY      etopic preg 1999    TUBAL COAGULATION LAPAROSCOPIC BILATERAL     [3] No Known Allergies  [4]   Social History  Tobacco Use    Smoking status: Every Day     Current packs/day: 1.00     Average packs/day: 1 pack/day for 19.0 years (19.0 ttl pk-yrs)     Types: Cigarettes    Smokeless tobacco: Never   Vaping Use    Vaping status: Never Used   Substance Use Topics    Alcohol use: Not Currently     Alcohol/week: 3.6 oz     Types: 6 Standard drinks or equivalent per week     Comment: rare    Drug use: Not Currently     Types: IV, Methamphetamines, Cocaine, Intravenous, Oral     Comment: meth 13 years, Quit 3 yrs ago   [5]   Family and Occupational History  Socioeconomic History    Marital status: Single     Spouse name: Not on file    Number of children: Not on file    Years of education: Not on file    Highest education level: GED or equivalent   Occupational History    Not on file

## 2025-07-17 ENCOUNTER — PATIENT OUTREACH (OUTPATIENT)
Dept: ONCOLOGY | Facility: MEDICAL CENTER | Age: 45
End: 2025-07-17
Payer: MEDICAID

## 2025-07-17 NOTE — PROGRESS NOTES
On July 17, 2025, , Rosie Aguilar, submitted an application to the [employer] on behalf of pt.

## 2025-07-23 ENCOUNTER — OFFICE VISIT (OUTPATIENT)
Dept: NEUROLOGY | Facility: MEDICAL CENTER | Age: 45
End: 2025-07-23
Attending: PSYCHIATRY & NEUROLOGY
Payer: MEDICAID

## 2025-07-23 VITALS
TEMPERATURE: 97.7 F | HEART RATE: 78 BPM | DIASTOLIC BLOOD PRESSURE: 88 MMHG | OXYGEN SATURATION: 98 % | WEIGHT: 159.39 LBS | BODY MASS INDEX: 29.33 KG/M2 | SYSTOLIC BLOOD PRESSURE: 130 MMHG | RESPIRATION RATE: 16 BRPM | HEIGHT: 62 IN

## 2025-07-23 DIAGNOSIS — G43.109 MIGRAINE WITH AURA AND WITHOUT STATUS MIGRAINOSUS, NOT INTRACTABLE: Primary | ICD-10-CM

## 2025-07-23 DIAGNOSIS — G62.9 NEUROPATHY: ICD-10-CM

## 2025-07-23 DIAGNOSIS — R42 DIZZINESS: ICD-10-CM

## 2025-07-23 PROCEDURE — 3075F SYST BP GE 130 - 139MM HG: CPT | Performed by: PSYCHIATRY & NEUROLOGY

## 2025-07-23 PROCEDURE — 99214 OFFICE O/P EST MOD 30 MIN: CPT

## 2025-07-23 PROCEDURE — 99204 OFFICE O/P NEW MOD 45 MIN: CPT | Performed by: PSYCHIATRY & NEUROLOGY

## 2025-07-23 PROCEDURE — 3079F DIAST BP 80-89 MM HG: CPT | Performed by: PSYCHIATRY & NEUROLOGY

## 2025-07-23 RX ORDER — PREGABALIN 75 MG/1
75 CAPSULE ORAL 3 TIMES DAILY
Qty: 90 CAPSULE | Refills: 1 | Status: SHIPPED | OUTPATIENT
Start: 2025-07-23 | End: 2025-09-21

## 2025-07-23 ASSESSMENT — PATIENT HEALTH QUESTIONNAIRE - PHQ9: CLINICAL INTERPRETATION OF PHQ2 SCORE: 0

## 2025-07-23 ASSESSMENT — FIBROSIS 4 INDEX: FIB4 SCORE: 0.48

## 2025-07-23 NOTE — PROGRESS NOTES
[facility] NEUROLOGY CLINIC    Date of Service: 07/23/25    Referred by: Kendra Fuller, A.P.R.N.  [address] [place] [address]  [place] [address]      Chief complain  Multiple neurological symptoms - Patient reports intermittent narrowing of vision, dizziness, headaches, lightheadedness, arm/leg weakness. Was last seen by a neurologist 2021 (per patient).       History of present illness (HPI)   This is a 45 year-old woman with a newly diagnosed left DCIS, ER/MI positive s/p left total mastectomy on 6/24/25. She is referred to the neurology clinic for multiple neurological symptoms, as detailed above.   The patient presented to clinic accompanied by her daughter. She reports a long-standing history of chronic migraines for at least six years, previously untreated aside from occasional use of over-the-counter Excedrin Migraine. Prior to this past month, she experienced migraines approximately 2-3 times per week, often associated with visual aura and lasting up to a few hours. Interestingly, she has not had any headaches in the last month. She also has a known history of temporomandibular joint (TMJ) dysfunction, which she believes may be contributing to her headaches.    In addition, she describes persistent dizziness and lightheadedness that have been present for many years without progression. She denies imbalance, gait instability, falls, or near falls. Nausea occasionally accompanies the dizziness, though there is no vomiting. She has not seen ENT or undergone vestibular therapy.    She also endorses intermittent episodes of generalized numbness, described as non-painful. Most recently, she has been experiencing nerve pain following her mastectomy. She previously took Lyrica (pregabalin) for this and expresses interest in resuming it. She denies any other current concerns.      Oncology history  6/24/25: Underwent a left total mastectomy      Review of Systems (ROS)  A comprehensive 10-point review of systems was  performed, with attention to chemotherapy-related toxicities. The patient denies fever, chills, night sweats, unintentional weight loss, mucositis, nausea, vomiting, diarrhea, constipation, rash, new or worsening neuropathy, shortness of breath, chest pain, or bleeding/bruising. No other systemic complaints were reported except as noted in the HPI.      Medical history  Past Medical History[1]    Surgical history  Past Surgical History[2]      Relevant family history  Family History   Problem Relation Age of Onset    Psychiatric Illness Mother         Bipolar    Lung Disease Mother     Cancer Father         Liver & Lung    Lung Disease Father         Also liver cancer and hepatitis c    Cancer Maternal Grandmother         lung    Psychiatric Illness Maternal Grandfather         Schizophrenia    Lung Disease Maternal Aunt          Social history  Social History     Tobacco Use    Smoking status: Every Day     Current packs/day: 1.00     Average packs/day: 1 pack/day for 19.0 years (19.0 ttl pk-yrs)     Types: Cigarettes    Smokeless tobacco: Never   Substance Use Topics    Alcohol use: Not Currently     Alcohol/week: 3.6 oz     Types: 6 Standard drinks or equivalent per week     Comment: rare     Depression Screening    Little interest or pleasure in doing things?  0 - not at all  Feeling down, depressed , or hopeless? 0 - not at all  Trouble falling or staying asleep, or sleeping too much?     Feeling tired or having little energy?     Poor appetite or overeating?     Feeling bad about yourself - or that you are a failure or have let yourself or your family down?    Trouble concentrating on things, such as reading the newspaper or watching television?    Moving or speaking so slowly that other people could have noticed.  Or the opposite - being so fidgety or restless that you have been moving around a lot more than usual?     Thoughts that you would be better off dead, or of hurting yourself?     Patient Health  "Questionnaire Score:        If depressive symptoms identified deferred to follow up visit unless specifically addressed in assesment and plan.    Interpretation of PHQ-9 Total Score   Score Severity   1-4 No Depression   5-9 Mild Depression   10-14 Moderate Depression   15-19 Moderately Severe Depression   20-27 Severe Depression    Tower Suicide Severity Rating Scale     Wish to be Dead?: No  Suicidal Thoughts: No    Suicidal Thoughts with Method Without Specific Plan or Intent to Act:    Suicidal Intent Without Specific Plan:    Suicide Intent with Specific Plan:    Suicide Behavior Question: No  How long ago did you do any of these?:    C-SSRS Risk Level: No Risk    Additional Suicide Screening Questions    Suspected or Confirmed Suicide Attempted?:    Harming or killing others?:      Tower Suicide Reassessment     New or continued thoughts about killing self?:    Preparing to end life?:            Medications    Active Medications[3]      Physical exam     Vitals:    07/23/25 0844   BP: 130/88   BP Location: Left arm   Patient Position: Sitting   BP Cuff Size: Adult   Pulse: 78   Resp: 16   Temp: 36.5 °C (97.7 °F)   TempSrc: Temporal   SpO2: 98%   Weight: 72.3 kg (159 lb 6.3 oz)   Height: 1.575 m (5' 2\")         General Exam:    In no distress.  Unlabored breathing.  No rash on visible skin.      Brief Neurological Exam:    Alert and oriented.  Speech fluency and comprehension are intact.  No cranial neuropathies appreciated.   Moves all extremities equally.   Walks unassisted.       Laboratory results  Lab Results   Component Value Date/Time    WBC 6.5 06/24/2025 01:17 PM    RBC 4.38 06/24/2025 01:17 PM    HEMOGLOBIN 10.7 (L) 06/24/2025 01:17 PM    HEMATOCRIT 34.4 (L) 06/24/2025 01:17 PM    MCV 78.5 (L) 06/24/2025 01:17 PM    MCH 24.4 (L) 06/24/2025 01:17 PM    MCHC 31.1 (L) 06/24/2025 01:17 PM    MPV 10.2 06/24/2025 01:17 PM    NEUTSPOLYS 54.8 04/20/2025 11:47 PM    NEUTSPOLYS 59.10 08/22/2017 04:34 AM " "   LYMPHOCYTES 34.8 04/20/2025 11:47 PM    LYMPHOCYTES 32.30 08/22/2017 04:34 AM    MONOCYTES 7.9 04/20/2025 11:47 PM    MONOCYTES 6.50 08/22/2017 04:34 AM    EOSINOPHILS 1.8 04/20/2025 11:47 PM    EOSINOPHILS 1.40 08/22/2017 04:34 AM    BASOPHILS 0.7 04/20/2025 11:47 PM    BASOPHILS 0.40 08/22/2017 04:34 AM    HYPOCHROMIA 1+ 01/28/2014 01:07 PM        Lab Results   Component Value Date/Time    SODIUM 136 06/24/2025 01:17 PM    POTASSIUM 4.1 06/24/2025 01:17 PM    CHLORIDE 103 06/24/2025 01:17 PM    CO2 21 06/24/2025 01:17 PM    GLUCOSE 85 06/24/2025 01:17 PM    BUN 12 06/24/2025 01:17 PM    CREATININE 0.97 06/24/2025 01:17 PM    CREATININE 0.8 12/01/2008 07:24 AM    BUNCREATRAT 12.0 04/20/2025 11:48 PM        No results found for: \"PROTHROMBTM\", \"INR\"       Imaging  Mammogram: 3/12/25  FINDINGS:  The breasts are heterogeneously dense, which may obscure small masses.     Cluster of microcalcifications is identified upper outer quadrant of the left breast. No other dominant mass or abnormal calcification or architectural distortion is identified.    CT head: 4/20/25  1. No acute intracranial process.   2. Small calcification along the course of the left MCA but no prominent   MCA distribution infarct seen on current exam.         Pathology and Molecular results  5/27/25:   FINAL DIAGNOSIS:     A. Left breast calcifications, superior outer, stereotactic biopsy:          High-grade ductal carcinoma in situ (DCIS) with calcifications           and necrosis.          No areas diagnostic for invasive component.          Estrogen receptors: Positive, strong, %          Progesterone receptors: Positive, weak to moderate, 31-40%   B. Left breast calcifications, central, stereotactic biopsy:          High-grade ductal carcinoma in situ (DCIS) with calcifications           and necrosis.          No areas diagnostic for invasive component.          Estrogen receptors: Positive, strong, %          Progesterone " receptors: Negative, <1% staining     6/24/25:  FINAL DIAGNOSIS:     A. Left breast, total mastectomy:          High-grade ductal carcinoma in situ (DCIS) present in upper           outer quadrant with adjacent biopsy site and Q clip biopsy           marker.          Biopsy site with X clip biopsy marker identified, no residual           DCIS identified in this area.          The DCIS demonstrates necrosis and calcifications and is           estimated to involve a 12mm area based upon its presence in           three sections.          All margins free of involvement by >2mm.   B. Left lateral margin:          Benign fibrofatty breast tissue, no atypia or malignancy           identified.   C. Left anterior, upper outer tissue:          Benign fibrofatty breast tissue, no atypia or malignancy           identified.   Upper outer focus     Estrogen Receptor (ER) Status:  Positive, strong, %     Progesterone Receptor (PgR) Status:  Positive, weak to moderate,     31-40%     Central focus     Estrogen Receptor (ER) Status:  Positive, strong, %     Progesterone Receptor (PgR) Status:  Negative, <1% staining       Assessment and Plan  The patient is a 45 year-old woman who presents with a history of chronic migraines, stable over the past six years. Headaches have been infrequent recently, with no concerning features or change in pattern. Neurological exam is non-focal. Given the long-standing, unchanged nature of her migraines and the absence of red flags, brain MRI is not indicated at this time.    Additionally, her history of ductal carcinoma in situ (DCIS), which does not metastasize to the brain, further supports deferring imaging.    She may continue using Excedrin Migraine as needed. Given the low frequency of attacks, preventative therapy is not warranted at this time. I advised that if the headaches become more frequent, she may trial daily magnesium and riboflavin. She will notify me of any changes in  headache character or frequency.    For chronic dizziness and lightheadedness--non-progressive and without imbalance, gait disturbance, or associated falls--I recommended ENT evaluation and vestibular therapy. I also advised her to monitor her blood pressure at home, especially during symptomatic episodes, as it was mildly elevated in clinic today.    Regarding post-mastectomy nerve pain, she has previously responded to pregabalin and wishes to restart it. A prescription for Lyrica was provided, and she will follow up if dose adjustment is needed.    She inquired about a recent head CT report noting a small calcification along the course of the left MCA. While I was unable to review the images directly, I explained that this likely represents a small area of vascular calcification, likely related to chronic hypertension and smoking history, and is not clinically significant in the absence of focal neurological signs. No further intervention is needed aside from counseling on smoking cessation.    She will follow up in one year, sooner if new symptoms arise.    1. Migraine with aura and without status migrainosus, not intractable (Primary)  Can try the following on a daily basis:  - Magnesium oxide 400 mg once a day  - Riboflavin 400 mg once a day    2. Dizziness  - Referral to ENT  - Referral to Physical Therapy    3. Neuropathy  - pregabalin (LYRICA) 75 MG Cap; Take 1 Capsule by mouth 3 times a day for 60 days.  Dispense: 90 Capsule; Refill: 1    Numerous questions were answered to the best of my knowledge. The patient is in agreement with the plan.     Return to the clinic in 12 months.       ADMINISTRATIVE BILLING  I personally spent a total of 53 minutes for this encounter.    Declan Gupta MD  Diplomate of the American Board of Psychiatry and Neurology.  General Neurology & Neuro-Oncology.  [facility].   of Clinical Neurology at [facility]  [facility]icine.             [1]   Past Medical History:  Diagnosis Date    ADHD     Anemia     Anxiety     Bipolar affective disorder (HCC) Dx 2008    no meds    Bipolar disorder (HCC) 01/01/2008    no meds    Blood transfusion     1999 eptopic preg    Breast cancer (HCC)     GERD (gastroesophageal reflux disease)     Hepatitis C     Hepatitis C 03/22/2010    treatment in 2017    Hepatitis C     Infectious disease     hep c    Migraine     Substance abuse (HCC)     Pt. states last used Meth 3 years ago.     Thyroid disease    [2]   Past Surgical History:  Procedure Laterality Date    MASTECTOMY Left 6/24/2025    Procedure: LEFT TOTAL MASTECTOMY, LEFT SENTINEL LYMPH NODE MAGNETIC DETECTION;  Surgeon: Lovely Herr M.D.;  Location: SURGERY SAME DAY [facility];  Service: General    IDENTIFICATION, LYMPH NODE, SENTINEL, INTRAOPERATIVE, USING DYE INJECTION Left 6/24/2025    Procedure: IDENTIFICATION, LYMPH NODE, SENTINEL, INTRAOPERATIVE, USING DYE INJECTION;  Surgeon: Lovely Herr M.D.;  Location: SURGERY SAME DAY [facility];  Service: General    PELVISCOPY  01/28/2014    Performed by Katrin Castro M.D. at [facility] ORS    ABDOMINAL EXPLORATION      eptopic oreg 1999    APPENDECTOMY      OTHER ABDOMINAL SURGERY      etopic preg 1999    TUBAL COAGULATION LAPAROSCOPIC BILATERAL     [3]   No outpatient medications have been marked as taking for the 7/23/25 encounter (Office Visit) with Declan Gupta M.D..

## 2025-07-24 NOTE — Clinical Note
REFERRAL APPROVAL NOTICE         Sent on July 24, 2025                   Talia Rose Salvatelli-Moniz  [address] [facility] [address]                   Dear Ms. Geovannyalbaro-Moniz,    After a careful review of the medical information and benefit coverage, [facility] has processed your referral. See below for additional details.    If applicable, you must be actively enrolled with your insurance for coverage of the authorized service. If you have any questions regarding your coverage, please contact your insurance directly.    REFERRAL INFORMATION   Referral #:  [id number]  Referred-To Provider    Referred-By Provider:  Otolaryngology    MAUREEN Eli PAUL D      [address] [facility]  [address]  [facility] [address] [phone] [address]  [address]  [facility] [address] [phone]    Referral Start Date:  07/23/2025  Referral End Date:   07/23/2026             SCHEDULING  If you do not already have an appointment, please call [phone] to make an appointment.     MORE INFORMATION  If you do not already have a [facility] account, sign up at: [email or web address]  You can access your medical information, make appointments, see lab results, billing information, and more.  If you have questions regarding this referral, please contact  the [facility] Referrals department at:             [phone]. Monday - Friday 8:00AM - 5:00PM.     Sincerely,    [facility]

## 2025-07-24 NOTE — Clinical Note
REFERRAL APPROVAL NOTICE         Sent on July 24, 2025                   Talia Rose Salvatelli-Moniz  [address]  [facility] [address]                   Dear Ms. Wangiz,    After a careful review of the medical information and benefit coverage, [facility] has processed your referral. See below for additional details.    If applicable, you must be actively enrolled with your insurance for coverage of the authorized service. If you have any questions regarding your coverage, please contact your insurance directly.    REFERRAL INFORMATION   Referral #:  [id number]  Referred-To Department    Referred-By Provider:  Physical Therapy    Declan Gupta M.D.   [facility] [address]      [address]  [place] [address]  [facility] [id number] [phone] [address]  [address]  [facility] [id number] [phone]    Referral Start Date:  07/23/2025  Referral End Date:   07/23/2026             SCHEDULING  If you do not already have an appointment, please call [phone] to make an appointment.     MORE INFORMATION  If you do not already have a [facility] account, sign up at: [email or web address]  You can access your medical information, make appointments, see lab results, billing information, and more.  If you have questions regarding this referral, please contact  the [facility] Referrals department at:             [phone]. Monday - Friday 8:00AM - 5:00PM.     Sincerely,    [facility]

## 2025-07-29 ENCOUNTER — PATIENT OUTREACH (OUTPATIENT)
Dept: ONCOLOGY | Facility: MEDICAL CENTER | Age: 45
End: 2025-07-29
Payer: MEDICAID

## 2025-07-30 ENCOUNTER — OFFICE VISIT (OUTPATIENT)
Age: 45
End: 2025-07-30
Payer: MEDICAID

## 2025-07-30 VITALS
BODY MASS INDEX: 29.26 KG/M2 | WEIGHT: 159 LBS | HEIGHT: 62 IN | HEART RATE: 91 BPM | OXYGEN SATURATION: 99 % | TEMPERATURE: 98.3 F | DIASTOLIC BLOOD PRESSURE: 84 MMHG | SYSTOLIC BLOOD PRESSURE: 138 MMHG

## 2025-07-30 DIAGNOSIS — D05.12 DUCTAL CARCINOMA IN SITU (DCIS) OF LEFT BREAST: Primary | ICD-10-CM

## 2025-07-30 ASSESSMENT — FIBROSIS 4 INDEX: FIB4 SCORE: 0.48

## 2025-07-30 NOTE — PROGRESS NOTES
7/30/2025  8:25 AM    Imaging facility:  [facility]  Primary care provider:  KATINA Wei ([facility])   Gynecology:  JAYSON Rivera  Medical oncologist:  Dr. Bacilio Bennett 8/15/25   Oncology wellness:  Dr. An Ramos  Cardiologist:  Dr. Ruy Milan 6/19/25    CC:   Chief Complaint   Patient presents with    Post-op        Interval history:  Olinda returns with her daughter Zarina for routine postoperative appointment. Pain continues to improve. Her neurologist recently started her on Lyrica. Denies any nausea/vomiting and bowel habits are at her baseline.     7/16/25 (Alina, APRN): Olinda returns with her daughter Zarina for routine postoperative appointment. She feels pain has been well controlled and feels some nerve pain. She reports that drain output would have been low enough last Friday to remove, but she didn't have transportation prior to today. Drain output has been around 20mL daily since the weekend.      7/2/25 (MD Nemesio): Olinda returns for routine postoperative appointment.  She had minimal nerve like pain after surgery and only mild nausea.  Bowel habits are back to normal.  She feels well overall.  Drain output is borderline for removal.       6/18/25 (MD Nemesio):  Olinda returns to discuss and finalize her surgery plan.  She desires total mastectomy and has been reading up on this.  She has seen pictures of total mastectomies without reconstruction to prepare herself better.  Her fiance's sister in law also underwent similar breast surgery and has been very helpful to her.  She signed up for the smoking cessation class which starts July 2, and is highly motivated to quit smoking.       Initial presentation:   6/2/25 (Alina, APRN):  This friendly 44 y.o. year old self-identified  female is kindly referred for virtual consultation regarding left breast cancer. She denies any palpable lumps, skin changes, or nipple discharge.  No prior breast biopsies or breast  "surgery.      LABS:  Lab Results   Component Value Date/Time    HBA1C 5.1 2025 04:34 PM        Problem List[1]    Past Surgical History[2]    Allergies[3]    Current Outpatient Medications   Medication Sig    pregabalin (LYRICA) 75 MG Cap Take 1 Capsule by mouth 3 times a day for 60 days.       Social History[4]     Social Hx - Family and Occupational[5]    Family History   Problem Relation Age of Onset    Psychiatric Illness Mother         Bipolar    Lung Disease Mother     Cancer Father         Liver & Lung    Lung Disease Father         Also liver cancer and hepatitis c    Cancer Maternal Grandmother         lung    Psychiatric Illness Maternal Grandfather         Schizophrenia    Lung Disease Maternal Aunt        OB History    Para Term  AB Living   8 4 3 0 3 3   SAB IAB Ectopic Molar Multiple Live Births   1 1 1 0 0 3   Obstetric Comments   Age of first delivery: 23   Menarche: 9   LMP: 2025   No HRT       Physical Exam:  /84 (BP Location: Right arm, Patient Position: Sitting, BP Cuff Size: Large adult)   Pulse 91   Temp 36.8 °C (98.3 °F) (Temporal)   Ht 1.575 m (5' 2\")   Wt 72.1 kg (159 lb)   SpO2 99%   BMI 29.08 kg/m²     General: Pleasant demeanor.  Appears well, no acute distress.  Surgical site:  LEFT horizontal incision is healing as expected with skin redundancy. Mild far lateral soft tissue swelling.  No infection, seroma, or hematoma.       1. Ductal carcinoma in situ (DCIS) of left breast            ASSESSMENT:  Multifocal LEFT breast HG DCIS.  Pathologic Stage 0 (pTis pNx/cN0).  Stereotactic biopsies 25, [facility]. LEFT TM and Magtrace 25.  Appointment with Dr. Bennett 8/15/25.      LEFT anterior superior upper outer HG DCIS with calcs and necrosis. Clinical Stage 0 (cTis cN0 M0).  Total area spanned by both groupings is over 4cm.                Residual 1.2cm HG DCIS.  MARGINS CLEAR.  [2.6cm calcs.  Tumark Q clip. 1.5cm posterior lateral " displacement.]              ER %, MO 31-40%     LEFT posterior inferior/central HG DCIS with calcs and necrosis. Clinical Stage 0 (cTis cN0 M0).  Total area spanned by both groupings is over 4cm.                No residual DCIS in TM.  [1cm calcs.  Tumark X clip. Medial 1cm displacement.]               ER %, MO negative (<1%).                 Axillary surgery not indicated.  Magtrace injected 25, counts to 200's.       Last bilateral mamm[facility]/12/2025 [facility]: Heterogeneously dense. Last prior mammo 2018     FH:  Father with liver (primary) and lung cancer (55), maternal grandmother with lung cancer (68), mom with lung cancer (71), paternal grandfather with tongue cancer - unknown age.               Referred to Genetics ([facility], 25).  Saliva test sent, negative multigene panel 25.        Menopausal/HRT status:    Age of first delivery: 23  Menarche: 9  LMP: 2025  No HRT     LIFESTYLE:  Currently smokes 1/2 PPD x32 years. Smoking cessation class starts 25.  No vaping or marijuana use. Former history of meth use for 13 years, reports cessation in 2020. Previous alcohol use, for last year and a half would drink 2-4 buzzballs daily. Quit drinking 5/15/25. BMI 30.      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      Personal history of leukemia at age 5, unknown treatment.      Hepatitis C, completed treatment and last blood work reported as undetectable.      Currently being treated with antibiotics for H. Pylori and BV.      ROS with complaints of chest pain, shortness of breath, palpitations, and lower extremity edema. Has PFT scheduled 25. Cardiology clearance 25.       ORT score 10 (HR).  No pain med agreement.    DISCUSSED/PLAN:  Patient has been encouraged to continue walking daily with a gradual return to regular exercise and reminded to  "monitor for seroma development. We discussed continuing to perform \"walk the wall\" stretching exercises for range of motion improvement. We have discussed close monitoring of seroma development with increase in activity and return to compression and contacting our office if swelling occurs. We discussed use of gentle heat as desired to the far lateral soft tissue swelling. Patient reminded to avoid direct contact with the skin and to not exceed 20 minutes at a time.     Patient advised to return in 1 month for seroma and wound check as she increases her activity level.        [1]   Patient Active Problem List  Diagnosis    Hepatitis C-no treatment, dx 1999, IV drug hx.    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    Hx of ectopic pregnancy - 1999    H/O transfusion - 1999    Carpal tunnel syndrome of right wrist    Ganglion cyst of finger of right hand    Ductal carcinoma in situ (DCIS) of left breast   [2]   Past Surgical History:  Procedure Laterality Date    MASTECTOMY Left 6/24/2025    Procedure: LEFT TOTAL MASTECTOMY, LEFT SENTINEL LYMPH NODE MAGNETIC DETECTION;  Surgeon: Lovely Herr M.D.;  Location: SURGERY SAME DAY [facility];  Service: General    IDENTIFICATION, LYMPH NODE, SENTINEL, INTRAOPERATIVE, USING DYE INJECTION Left 6/24/2025    Procedure: IDENTIFICATION, LYMPH NODE, SENTINEL, INTRAOPERATIVE, USING DYE INJECTION;  Surgeon: Lovely Herr M.D.;  Location: SURGERY SAME DAY [facility];  Service: General    PELVISCOPY  01/28/2014    Performed by Katrin Castro M.D. at SURGERY [facility]    ABDOMINAL EXPLORATION      eptopic oreg 1999    APPENDECTOMY      OTHER ABDOMINAL SURGERY      etopic preg 1999    TUBAL COAGULATION LAPAROSCOPIC BILATERAL     [3] No Known Allergies  [4]   Social History  Tobacco Use    Smoking status: Every Day     Current packs/day: 1.00     Average packs/day: 1 pack/day for 19.0 years (19.0 ttl pk-yrs)     " Types: Cigarettes    Smokeless tobacco: Never   Vaping Use    Vaping status: Never Used   Substance Use Topics    Alcohol use: Not Currently     Alcohol/week: 3.6 oz     Types: 6 Standard drinks or equivalent per week     Comment: rare    Drug use: Not Currently     Types: IV, Methamphetamines, Cocaine, Intravenous, Oral     Comment: meth 13 years, Quit 3 yrs ago   [5]   Family and Occupational History  Socioeconomic History    Marital status: Single     Spouse name: Not on file    Number of children: Not on file    Years of education: Not on file    Highest education level: GED or equivalent   Occupational History    Not on file

## 2025-07-30 NOTE — PROGRESS NOTES
On July 29, 2025, , Rosie Aguilar, received the following email from pt. regarding concerns around her [employer] Check.     “Hi so remember I told you I had the issue with the check that I had received from [employer]? So the account that it was deposited in the owner of the account now has received the check back however I cannot get it cashed by her bank or [facility] because they're already signed and I guess it's already been rejected by the [employer] so I was wondering if you could contact right now first time people and let them know and if they can stop payment and resubmit it maybe I could go ahead and pick it up or if you could just send me a contact phone number that way I could call and deal with it myself I don't know how you guys want to handle that but please let me know which way to go from here thank you.”    TAYLOR Aguilar emailed the [employer] for assistance with this pt.'s concerns.     TAYLOR Aguilar informed pt. of the email and that she will see if the foundation is able to assist.

## 2025-07-31 ENCOUNTER — APPOINTMENT (OUTPATIENT)
Dept: RADIOLOGY | Facility: MEDICAL CENTER | Age: 45
End: 2025-07-31
Attending: ADVANCED PRACTICE MIDWIFE
Payer: MEDICAID

## 2025-07-31 ENCOUNTER — APPOINTMENT (OUTPATIENT)
Dept: OBGYN | Facility: CLINIC | Age: 45
End: 2025-07-31
Payer: MEDICAID

## 2025-07-31 ENCOUNTER — PATIENT OUTREACH (OUTPATIENT)
Dept: ONCOLOGY | Facility: MEDICAL CENTER | Age: 45
End: 2025-07-31

## 2025-07-31 DIAGNOSIS — N92.1 MENORRHAGIA WITH IRREGULAR CYCLE: ICD-10-CM

## 2025-07-31 DIAGNOSIS — N80.129 ENDOMETRIOMA: ICD-10-CM

## 2025-07-31 PROCEDURE — 76830 TRANSVAGINAL US NON-OB: CPT

## 2025-08-04 ENCOUNTER — PATIENT OUTREACH (OUTPATIENT)
Dept: ONCOLOGY | Facility: MEDICAL CENTER | Age: 45
End: 2025-08-04
Payer: MEDICAID

## 2025-08-12 ENCOUNTER — APPOINTMENT (OUTPATIENT)
Facility: MEDICAL CENTER | Age: 45
End: 2025-08-12
Attending: INTERNAL MEDICINE
Payer: MEDICAID

## 2025-08-15 ENCOUNTER — HOSPITAL ENCOUNTER (OUTPATIENT)
Facility: MEDICAL CENTER | Age: 45
End: 2025-08-15
Attending: INTERNAL MEDICINE
Payer: MEDICAID

## 2025-08-15 VITALS
TEMPERATURE: 97.9 F | WEIGHT: 159 LBS | BODY MASS INDEX: 29.26 KG/M2 | OXYGEN SATURATION: 98 % | HEART RATE: 94 BPM | HEIGHT: 62 IN | SYSTOLIC BLOOD PRESSURE: 108 MMHG | DIASTOLIC BLOOD PRESSURE: 68 MMHG

## 2025-08-15 DIAGNOSIS — D05.12 DUCTAL CARCINOMA IN SITU (DCIS) OF LEFT BREAST: Primary | ICD-10-CM

## 2025-08-15 PROCEDURE — 99212 OFFICE O/P EST SF 10 MIN: CPT | Performed by: INTERNAL MEDICINE

## 2025-08-15 RX ORDER — TAMOXIFEN CITRATE 10 MG/1
5 TABLET ORAL DAILY
Qty: 90 TABLET | Refills: 3 | Status: SHIPPED | OUTPATIENT
Start: 2025-08-15

## 2025-08-15 ASSESSMENT — FIBROSIS 4 INDEX: FIB4 SCORE: 0.48

## 2025-08-19 ENCOUNTER — PATIENT OUTREACH (OUTPATIENT)
Dept: ONCOLOGY | Facility: MEDICAL CENTER | Age: 45
End: 2025-08-19
Payer: MEDICAID

## 2025-08-21 ENCOUNTER — PATIENT OUTREACH (OUTPATIENT)
Dept: ONCOLOGY | Facility: MEDICAL CENTER | Age: 45
End: 2025-08-21
Payer: MEDICAID

## 2025-08-27 ENCOUNTER — PATIENT OUTREACH (OUTPATIENT)
Dept: ONCOLOGY | Facility: MEDICAL CENTER | Age: 45
End: 2025-08-27
Payer: MEDICAID

## 2025-08-28 ENCOUNTER — PATIENT OUTREACH (OUTPATIENT)
Dept: ONCOLOGY | Facility: MEDICAL CENTER | Age: 45
End: 2025-08-28
Payer: MEDICAID

## 2025-09-03 ENCOUNTER — APPOINTMENT (OUTPATIENT)
Dept: OBGYN | Facility: CLINIC | Age: 45
End: 2025-09-03
Payer: MEDICAID

## 2025-09-15 ENCOUNTER — APPOINTMENT (OUTPATIENT)
Age: 45
End: 2025-09-15
Payer: MEDICAID

## (undated) DEVICE — ELECTRODE DUAL RETURN W/ CORD - (50/PK)

## (undated) DEVICE — MEDICINE CUP STERILE 2 OZ (100/CA)

## (undated) DEVICE — PACK MINOR ROSEVIEW - (7EA/CA)

## (undated) DEVICE — TOWEL STOP TIMEOUT SAFETY FLAG (40EA/CA)

## (undated) DEVICE — SUCTION INSTRUMENT YANKAUER BULBOUS TIP W/O VENT (50EA/CA)

## (undated) DEVICE — SPONGE GAUZESTER 4 X 4 4PLY - (128PK/CA)

## (undated) DEVICE — COVER CIV-FLEX TRANSDUCER - (24/BX)

## (undated) DEVICE — SUTURE 3-0 ETHILON PS-1 (36PK/BX)

## (undated) DEVICE — SPONGE GAUZESTER. 2X2 4-PL - (2/PK 50PK/BX 30BX/CS)

## (undated) DEVICE — KIT  I.V. START (100EA/CA)

## (undated) DEVICE — DRAPE LARGE 3 QUARTER - (20/CA)

## (undated) DEVICE — CANISTER SUCTION RIGID RED 1500CC (40EA/CA)

## (undated) DEVICE — SUTURE 3-0 VICRYL PLUS SH - 8X 18 INCH (12/BX)

## (undated) DEVICE — SLEEVE VASO DVT COMPRESSION CALF MED - (10PR/CA)

## (undated) DEVICE — GOWN WARMING STANDARD FLEX - (30/CA)

## (undated) DEVICE — DEVICE MONOPOLAR RF PEAK PLASMABLADE 3.0S

## (undated) DEVICE — MASK OXYGEN VNYL ADLT MED CONC WITH 7 FOOT TUBING - (50EA/CA)

## (undated) DEVICE — GLOVE BIOGEL INDICATOR SZ 6.5 SURGICAL PF LTX - (50PR/BX 4BX/CA)

## (undated) DEVICE — SET LEADWIRE 5 LEAD BEDSIDE DISPOSABLE ECG (1SET OF 5/EA)

## (undated) DEVICE — LACTATED RINGERS INJ 1000 ML - (14EA/CA 60CA/PF)

## (undated) DEVICE — GLOVE BIOGEL SZ 6 PF LATEX - (50EA/BX 4BX/CA)

## (undated) DEVICE — CHLORAPREP 26 ML APPLICATOR - ORANGE TINT(25/CA)

## (undated) DEVICE — SHEET TRANSVERSE LAP - (12EA/CA)

## (undated) DEVICE — COVER LIGHT HANDLE FLEXIBLE - SOFT (2EA/PK 80PK/CA)

## (undated) DEVICE — PAD MAGNETIC INSTRUMENT FOAM HOLDER (200/CA)

## (undated) DEVICE — SOLUTION PREP PVP IODINE 3/4 OZ POUCH PACKET CONTAINER STERILE LATEX FREE

## (undated) DEVICE — SUTURE 4-0 MONOCRYL PLUS PS-2 - 27 INCH (36/BX)

## (undated) DEVICE — TUBE CONNECTING SUCTION - CLEAR PLASTIC STERILE 72 IN (50EA/CA)

## (undated) DEVICE — CLOSURE SKIN STRIP 1/2 X 4 IN - (STERI STRIP) (50/BX 4BX/CA)

## (undated) DEVICE — SYSTEM PREVENA INCISION MNGM - (1/EA)

## (undated) DEVICE — WATER IRRIGATION STERILE 1000ML (12EA/CA)

## (undated) DEVICE — SENSOR OXIMETER ADULT SPO2 RD SET (20EA/BX)

## (undated) DEVICE — SODIUM CHL IRRIGATION 0.9% 1000ML (12EA/CA)

## (undated) DEVICE — PLUMEPEN ULTRA 3/8 IN X 10 FT HOSE (20EA/CA)

## (undated) DEVICE — CANISTER SUCTION 3000ML MECHANICAL FILTER AUTO SHUTOFF MEDI-VAC NONSTERILE LF DISP (40EA/CA)

## (undated) DEVICE — PAD LAP STERILE 18 X 18 - (5/PK 40PK/CA)

## (undated) DEVICE — TUBING CLEARLINK DUO-VENT - C-FLO (48EA/CA)

## (undated) DEVICE — SUTURE  MONOCRYL PLUS UD 27IN(70CM) USP4-0(M1.5) S/A PS-2 PRM MP (36EA/BX)

## (undated) DEVICE — SUTURE 3-0 VICRYL PLUS SH - 27 INCH (36/BX)

## (undated) DEVICE — CANNULA O2 COMFORT SOFT EAR ADULT 7 FT TUBING (50/CA)

## (undated) DEVICE — SUTURE GENERAL